# Patient Record
Sex: FEMALE | Race: WHITE | NOT HISPANIC OR LATINO | Employment: UNEMPLOYED | URBAN - METROPOLITAN AREA
[De-identification: names, ages, dates, MRNs, and addresses within clinical notes are randomized per-mention and may not be internally consistent; named-entity substitution may affect disease eponyms.]

---

## 2018-03-13 ENCOUNTER — APPOINTMENT (OUTPATIENT)
Dept: PREADMISSION TESTING | Facility: HOSPITAL | Age: 58
End: 2018-03-13

## 2018-03-13 VITALS — WEIGHT: 160.94 LBS | HEIGHT: 65 IN | BODY MASS INDEX: 26.81 KG/M2

## 2018-03-13 LAB
DEPRECATED RDW RBC AUTO: 46.1 FL (ref 37–54)
ERYTHROCYTE [DISTWIDTH] IN BLOOD BY AUTOMATED COUNT: 13.1 % (ref 11.3–14.5)
GLUCOSE BLD-MCNC: 84 MG/DL (ref 70–100)
HBA1C MFR BLD: 5.7 % (ref 4.8–5.6)
HCT VFR BLD AUTO: 39.9 % (ref 34.5–44)
HGB BLD-MCNC: 13.1 G/DL (ref 11.5–15.5)
MCH RBC QN AUTO: 31.4 PG (ref 27–31)
MCHC RBC AUTO-ENTMCNC: 32.8 G/DL (ref 32–36)
MCV RBC AUTO: 95.7 FL (ref 80–99)
PLATELET # BLD AUTO: 206 10*3/MM3 (ref 150–450)
PMV BLD AUTO: 10.4 FL (ref 6–12)
RBC # BLD AUTO: 4.17 10*6/MM3 (ref 3.89–5.14)
WBC NRBC COR # BLD: 7.03 10*3/MM3 (ref 3.5–10.8)

## 2018-03-13 PROCEDURE — 82947 ASSAY GLUCOSE BLOOD QUANT: CPT | Performed by: ORTHOPAEDIC SURGERY

## 2018-03-13 PROCEDURE — 36415 COLL VENOUS BLD VENIPUNCTURE: CPT

## 2018-03-13 PROCEDURE — 83036 HEMOGLOBIN GLYCOSYLATED A1C: CPT | Performed by: ORTHOPAEDIC SURGERY

## 2018-03-13 PROCEDURE — 93005 ELECTROCARDIOGRAM TRACING: CPT

## 2018-03-13 PROCEDURE — 85027 COMPLETE CBC AUTOMATED: CPT | Performed by: ORTHOPAEDIC SURGERY

## 2018-03-13 PROCEDURE — 93010 ELECTROCARDIOGRAM REPORT: CPT | Performed by: INTERNAL MEDICINE

## 2018-03-13 RX ORDER — LEVOTHYROXINE SODIUM 0.1 MG/1
100 TABLET ORAL DAILY
COMMUNITY

## 2018-03-13 RX ORDER — SERTRALINE HYDROCHLORIDE 100 MG/1
100 TABLET, FILM COATED ORAL 2 TIMES DAILY
COMMUNITY

## 2018-03-14 ENCOUNTER — ANESTHESIA EVENT (OUTPATIENT)
Dept: PERIOP | Facility: HOSPITAL | Age: 58
End: 2018-03-14

## 2018-03-14 ENCOUNTER — APPOINTMENT (OUTPATIENT)
Dept: GENERAL RADIOLOGY | Facility: HOSPITAL | Age: 58
End: 2018-03-14

## 2018-03-14 ENCOUNTER — ANESTHESIA (OUTPATIENT)
Dept: PERIOP | Facility: HOSPITAL | Age: 58
End: 2018-03-14

## 2018-03-14 ENCOUNTER — HOSPITAL ENCOUNTER (INPATIENT)
Facility: HOSPITAL | Age: 58
LOS: 3 days | Discharge: HOME | End: 2018-03-17
Attending: ORTHOPAEDIC SURGERY | Admitting: ORTHOPAEDIC SURGERY

## 2018-03-14 DIAGNOSIS — Z78.9 IMPAIRED MOBILITY AND ADLS: ICD-10-CM

## 2018-03-14 DIAGNOSIS — Z74.09 IMPAIRED FUNCTIONAL MOBILITY, BALANCE, GAIT, AND ENDURANCE: Primary | ICD-10-CM

## 2018-03-14 DIAGNOSIS — Z74.09 IMPAIRED MOBILITY AND ADLS: ICD-10-CM

## 2018-03-14 PROBLEM — M54.9 BACK PAIN: Status: ACTIVE | Noted: 2018-03-14

## 2018-03-14 PROBLEM — E11.9 DM (DIABETES MELLITUS) (HCC): Status: ACTIVE | Noted: 2018-03-14

## 2018-03-14 PROBLEM — Z98.1 S/P LUMBAR FUSION: Status: ACTIVE | Noted: 2018-03-14

## 2018-03-14 PROBLEM — E03.9 HYPOTHYROID: Status: ACTIVE | Noted: 2018-03-14

## 2018-03-14 PROBLEM — Z72.0 TOBACCO ABUSE: Status: ACTIVE | Noted: 2018-03-14

## 2018-03-14 LAB
GLUCOSE BLDC GLUCOMTR-MCNC: 138 MG/DL (ref 70–130)
GLUCOSE BLDC GLUCOMTR-MCNC: 140 MG/DL (ref 70–130)
GLUCOSE BLDC GLUCOMTR-MCNC: 170 MG/DL (ref 70–130)

## 2018-03-14 PROCEDURE — 25010000003 CEFAZOLIN PER 500 MG: Performed by: ORTHOPAEDIC SURGERY

## 2018-03-14 PROCEDURE — 25010000002 FENTANYL CITRATE (PF) 100 MCG/2ML SOLUTION: Performed by: NURSE ANESTHETIST, CERTIFIED REGISTERED

## 2018-03-14 PROCEDURE — 25010000002 NEOSTIGMINE PER 0.5 MG: Performed by: NURSE ANESTHETIST, CERTIFIED REGISTERED

## 2018-03-14 PROCEDURE — 07DR3ZZ EXTRACTION OF ILIAC BONE MARROW, PERCUTANEOUS APPROACH: ICD-10-PCS | Performed by: ORTHOPAEDIC SURGERY

## 2018-03-14 PROCEDURE — 25010000002 HYDROMORPHONE PER 4 MG: Performed by: NURSE ANESTHETIST, CERTIFIED REGISTERED

## 2018-03-14 PROCEDURE — P9041 ALBUMIN (HUMAN),5%, 50ML: HCPCS

## 2018-03-14 PROCEDURE — 97161 PT EVAL LOW COMPLEX 20 MIN: CPT

## 2018-03-14 PROCEDURE — 63710000001 INSULIN LISPRO (HUMAN) PER 5 UNITS: Performed by: NURSE PRACTITIONER

## 2018-03-14 PROCEDURE — 82962 GLUCOSE BLOOD TEST: CPT

## 2018-03-14 PROCEDURE — 0SG0071 FUSION OF LUMBAR VERTEBRAL JOINT WITH AUTOLOGOUS TISSUE SUBSTITUTE, POSTERIOR APPROACH, POSTERIOR COLUMN, OPEN APPROACH: ICD-10-PCS | Performed by: ORTHOPAEDIC SURGERY

## 2018-03-14 PROCEDURE — 25010000002 PROPOFOL 10 MG/ML EMULSION: Performed by: NURSE ANESTHETIST, CERTIFIED REGISTERED

## 2018-03-14 PROCEDURE — 25810000003 SODIUM CHLORIDE 0.9 % WITH KCL 20 MEQ 20-0.9 MEQ/L-% SOLUTION: Performed by: ORTHOPAEDIC SURGERY

## 2018-03-14 PROCEDURE — 25010000002 ONDANSETRON PER 1 MG: Performed by: NURSE ANESTHETIST, CERTIFIED REGISTERED

## 2018-03-14 PROCEDURE — 76001 HC FLUORO GREATER THAN 1 HOUR: CPT

## 2018-03-14 PROCEDURE — C1713 ANCHOR/SCREW BN/BN,TIS/BN: HCPCS | Performed by: ORTHOPAEDIC SURGERY

## 2018-03-14 PROCEDURE — 0SG00AJ FUSION OF LUMBAR VERTEBRAL JOINT WITH INTERBODY FUSION DEVICE, POSTERIOR APPROACH, ANTERIOR COLUMN, OPEN APPROACH: ICD-10-PCS | Performed by: ORTHOPAEDIC SURGERY

## 2018-03-14 PROCEDURE — 94799 UNLISTED PULMONARY SVC/PX: CPT

## 2018-03-14 PROCEDURE — 25010000002 HEPARIN (PORCINE) PER 1000 UNITS

## 2018-03-14 PROCEDURE — 25010000002 ALBUMIN HUMAN 5% PER 50 ML

## 2018-03-14 PROCEDURE — 25010000002 HYDROMORPHONE PER 4 MG: Performed by: ORTHOPAEDIC SURGERY

## 2018-03-14 PROCEDURE — 76000 FLUOROSCOPY <1 HR PHYS/QHP: CPT

## 2018-03-14 PROCEDURE — 25010000002 DEXAMETHASONE PER 1 MG: Performed by: NURSE ANESTHETIST, CERTIFIED REGISTERED

## 2018-03-14 DEVICE — ROD PREBNT SPINE EXPEDIUM TI 5.5X40MM: Type: IMPLANTABLE DEVICE | Status: FUNCTIONAL

## 2018-03-14 DEVICE — SCRW CORT VIPER PLS5.5 TI FIX 6X40MM: Type: IMPLANTABLE DEVICE | Status: FUNCTIONAL

## 2018-03-14 DEVICE — SCRW VIPER INNR ST: Type: IMPLANTABLE DEVICE | Status: FUNCTIONAL

## 2018-03-14 DEVICE — SPACR OPAL 10X12X28MM: Type: IMPLANTABLE DEVICE | Status: FUNCTIONAL

## 2018-03-14 RX ORDER — ONDANSETRON 2 MG/ML
4 INJECTION INTRAMUSCULAR; INTRAVENOUS EVERY 6 HOURS PRN
Status: DISCONTINUED | OUTPATIENT
Start: 2018-03-14 | End: 2018-03-15 | Stop reason: SDUPTHER

## 2018-03-14 RX ORDER — CEFAZOLIN SODIUM IN 0.9 % NACL 2 G/100 ML
2 PLASTIC BAG, INJECTION (ML) INTRAVENOUS ONCE
Status: COMPLETED | OUTPATIENT
Start: 2018-03-14 | End: 2018-03-14

## 2018-03-14 RX ORDER — DEXTROSE MONOHYDRATE 25 G/50ML
25 INJECTION, SOLUTION INTRAVENOUS
Status: DISCONTINUED | OUTPATIENT
Start: 2018-03-14 | End: 2018-03-17 | Stop reason: HOSPADM

## 2018-03-14 RX ORDER — GLYCOPYRROLATE 0.2 MG/ML
INJECTION INTRAMUSCULAR; INTRAVENOUS AS NEEDED
Status: DISCONTINUED | OUTPATIENT
Start: 2018-03-14 | End: 2018-03-14 | Stop reason: SURG

## 2018-03-14 RX ORDER — BISACODYL 5 MG/1
5 TABLET, DELAYED RELEASE ORAL DAILY PRN
Status: DISCONTINUED | OUTPATIENT
Start: 2018-03-14 | End: 2018-03-17 | Stop reason: HOSPADM

## 2018-03-14 RX ORDER — NICOTINE 21 MG/24HR
1 PATCH, TRANSDERMAL 24 HOURS TRANSDERMAL EVERY 24 HOURS
Status: DISCONTINUED | OUTPATIENT
Start: 2018-03-14 | End: 2018-03-17 | Stop reason: HOSPADM

## 2018-03-14 RX ORDER — NALOXONE HCL 0.4 MG/ML
0.4 VIAL (ML) INJECTION
Status: DISCONTINUED | OUTPATIENT
Start: 2018-03-14 | End: 2018-03-17 | Stop reason: HOSPADM

## 2018-03-14 RX ORDER — PROPOFOL 10 MG/ML
VIAL (ML) INTRAVENOUS AS NEEDED
Status: DISCONTINUED | OUTPATIENT
Start: 2018-03-14 | End: 2018-03-14 | Stop reason: SURG

## 2018-03-14 RX ORDER — PROMETHAZINE HYDROCHLORIDE 25 MG/ML
6.25 INJECTION, SOLUTION INTRAMUSCULAR; INTRAVENOUS ONCE AS NEEDED
Status: DISCONTINUED | OUTPATIENT
Start: 2018-03-14 | End: 2018-03-14 | Stop reason: HOSPADM

## 2018-03-14 RX ORDER — PROMETHAZINE HYDROCHLORIDE 25 MG/1
25 SUPPOSITORY RECTAL ONCE AS NEEDED
Status: DISCONTINUED | OUTPATIENT
Start: 2018-03-14 | End: 2018-03-14 | Stop reason: HOSPADM

## 2018-03-14 RX ORDER — MORPHINE SULFATE 2 MG/ML
1 INJECTION, SOLUTION INTRAMUSCULAR; INTRAVENOUS EVERY 4 HOURS PRN
Status: DISCONTINUED | OUTPATIENT
Start: 2018-03-14 | End: 2018-03-17 | Stop reason: HOSPADM

## 2018-03-14 RX ORDER — FENTANYL CITRATE 50 UG/ML
INJECTION, SOLUTION INTRAMUSCULAR; INTRAVENOUS AS NEEDED
Status: DISCONTINUED | OUTPATIENT
Start: 2018-03-14 | End: 2018-03-14 | Stop reason: SURG

## 2018-03-14 RX ORDER — SODIUM CHLORIDE 0.9 % (FLUSH) 0.9 %
1-10 SYRINGE (ML) INJECTION AS NEEDED
Status: DISCONTINUED | OUTPATIENT
Start: 2018-03-14 | End: 2018-03-17 | Stop reason: HOSPADM

## 2018-03-14 RX ORDER — FENTANYL CITRATE 50 UG/ML
50 INJECTION, SOLUTION INTRAMUSCULAR; INTRAVENOUS
Status: DISCONTINUED | OUTPATIENT
Start: 2018-03-14 | End: 2018-03-14 | Stop reason: HOSPADM

## 2018-03-14 RX ORDER — ACETAMINOPHEN 325 MG/1
650 TABLET ORAL EVERY 4 HOURS PRN
Status: DISCONTINUED | OUTPATIENT
Start: 2018-03-14 | End: 2018-03-17 | Stop reason: HOSPADM

## 2018-03-14 RX ORDER — PROMETHAZINE HYDROCHLORIDE 25 MG/1
25 TABLET ORAL ONCE AS NEEDED
Status: DISCONTINUED | OUTPATIENT
Start: 2018-03-14 | End: 2018-03-14 | Stop reason: HOSPADM

## 2018-03-14 RX ORDER — PANTOPRAZOLE SODIUM 40 MG/1
40 TABLET, DELAYED RELEASE ORAL
Status: DISCONTINUED | OUTPATIENT
Start: 2018-03-15 | End: 2018-03-17 | Stop reason: HOSPADM

## 2018-03-14 RX ORDER — ALBUMIN, HUMAN INJ 5% 5 %
SOLUTION INTRAVENOUS CONTINUOUS PRN
Status: DISCONTINUED | OUTPATIENT
Start: 2018-03-14 | End: 2018-03-14 | Stop reason: SURG

## 2018-03-14 RX ORDER — SODIUM CHLORIDE, SODIUM LACTATE, POTASSIUM CHLORIDE, CALCIUM CHLORIDE 600; 310; 30; 20 MG/100ML; MG/100ML; MG/100ML; MG/100ML
9 INJECTION, SOLUTION INTRAVENOUS CONTINUOUS
Status: DISCONTINUED | OUTPATIENT
Start: 2018-03-14 | End: 2018-03-17 | Stop reason: HOSPADM

## 2018-03-14 RX ORDER — CELECOXIB 200 MG/1
200 CAPSULE ORAL ONCE
Status: COMPLETED | OUTPATIENT
Start: 2018-03-14 | End: 2018-03-14

## 2018-03-14 RX ORDER — DEXAMETHASONE SODIUM PHOSPHATE 10 MG/ML
INJECTION INTRAMUSCULAR; INTRAVENOUS AS NEEDED
Status: DISCONTINUED | OUTPATIENT
Start: 2018-03-14 | End: 2018-03-14 | Stop reason: SURG

## 2018-03-14 RX ORDER — LIDOCAINE HYDROCHLORIDE 10 MG/ML
0.5 INJECTION, SOLUTION EPIDURAL; INFILTRATION; INTRACAUDAL; PERINEURAL ONCE AS NEEDED
Status: COMPLETED | OUTPATIENT
Start: 2018-03-14 | End: 2018-03-14

## 2018-03-14 RX ORDER — BISACODYL 10 MG
10 SUPPOSITORY, RECTAL RECTAL DAILY PRN
Status: DISCONTINUED | OUTPATIENT
Start: 2018-03-14 | End: 2018-03-17 | Stop reason: HOSPADM

## 2018-03-14 RX ORDER — LEVOTHYROXINE SODIUM 0.1 MG/1
100 TABLET ORAL
Status: DISCONTINUED | OUTPATIENT
Start: 2018-03-15 | End: 2018-03-17 | Stop reason: HOSPADM

## 2018-03-14 RX ORDER — OXYCODONE HCL 10 MG/1
10 TABLET, FILM COATED, EXTENDED RELEASE ORAL ONCE
Status: COMPLETED | OUTPATIENT
Start: 2018-03-14 | End: 2018-03-14

## 2018-03-14 RX ORDER — OXYCODONE AND ACETAMINOPHEN 7.5; 325 MG/1; MG/1
1 TABLET ORAL EVERY 4 HOURS PRN
Status: DISCONTINUED | OUTPATIENT
Start: 2018-03-14 | End: 2018-03-15

## 2018-03-14 RX ORDER — LIDOCAINE HYDROCHLORIDE 10 MG/ML
INJECTION, SOLUTION INFILTRATION; PERINEURAL AS NEEDED
Status: DISCONTINUED | OUTPATIENT
Start: 2018-03-14 | End: 2018-03-14 | Stop reason: SURG

## 2018-03-14 RX ORDER — ZOLPIDEM TARTRATE 5 MG/1
5 TABLET ORAL NIGHTLY PRN
Status: DISCONTINUED | OUTPATIENT
Start: 2018-03-14 | End: 2018-03-17 | Stop reason: HOSPADM

## 2018-03-14 RX ORDER — HYDROMORPHONE HCL 110MG/55ML
2 PATIENT CONTROLLED ANALGESIA SYRINGE INTRAVENOUS EVERY 4 HOURS PRN
Status: DISCONTINUED | OUTPATIENT
Start: 2018-03-14 | End: 2018-03-17 | Stop reason: HOSPADM

## 2018-03-14 RX ORDER — SODIUM CHLORIDE 0.9 % (FLUSH) 0.9 %
1-10 SYRINGE (ML) INJECTION AS NEEDED
Status: DISCONTINUED | OUTPATIENT
Start: 2018-03-14 | End: 2018-03-14 | Stop reason: HOSPADM

## 2018-03-14 RX ORDER — HYDRALAZINE HYDROCHLORIDE 20 MG/ML
10 INJECTION INTRAMUSCULAR; INTRAVENOUS EVERY 6 HOURS PRN
Status: DISCONTINUED | OUTPATIENT
Start: 2018-03-14 | End: 2018-03-17 | Stop reason: HOSPADM

## 2018-03-14 RX ORDER — NICOTINE POLACRILEX 4 MG
15 LOZENGE BUCCAL
Status: DISCONTINUED | OUTPATIENT
Start: 2018-03-14 | End: 2018-03-17 | Stop reason: HOSPADM

## 2018-03-14 RX ORDER — ONDANSETRON 2 MG/ML
4 INJECTION INTRAMUSCULAR; INTRAVENOUS EVERY 6 HOURS PRN
Status: DISCONTINUED | OUTPATIENT
Start: 2018-03-14 | End: 2018-03-17 | Stop reason: HOSPADM

## 2018-03-14 RX ORDER — MAGNESIUM HYDROXIDE 1200 MG/15ML
LIQUID ORAL AS NEEDED
Status: DISCONTINUED | OUTPATIENT
Start: 2018-03-14 | End: 2018-03-14 | Stop reason: HOSPADM

## 2018-03-14 RX ORDER — FAMOTIDINE 20 MG/1
20 TABLET, FILM COATED ORAL ONCE
Status: COMPLETED | OUTPATIENT
Start: 2018-03-14 | End: 2018-03-14

## 2018-03-14 RX ORDER — ACETAMINOPHEN 500 MG
1000 TABLET ORAL ONCE
Status: COMPLETED | OUTPATIENT
Start: 2018-03-14 | End: 2018-03-14

## 2018-03-14 RX ORDER — SODIUM CHLORIDE AND POTASSIUM CHLORIDE 150; 900 MG/100ML; MG/100ML
100 INJECTION, SOLUTION INTRAVENOUS CONTINUOUS
Status: DISCONTINUED | OUTPATIENT
Start: 2018-03-14 | End: 2018-03-17 | Stop reason: HOSPADM

## 2018-03-14 RX ORDER — HYDROMORPHONE HYDROCHLORIDE 1 MG/ML
0.5 INJECTION, SOLUTION INTRAMUSCULAR; INTRAVENOUS; SUBCUTANEOUS
Status: DISCONTINUED | OUTPATIENT
Start: 2018-03-14 | End: 2018-03-14 | Stop reason: HOSPADM

## 2018-03-14 RX ORDER — PREGABALIN 75 MG/1
75 CAPSULE ORAL ONCE
Status: COMPLETED | OUTPATIENT
Start: 2018-03-14 | End: 2018-03-14

## 2018-03-14 RX ORDER — GABAPENTIN 100 MG/1
200 CAPSULE ORAL NIGHTLY
Status: DISCONTINUED | OUTPATIENT
Start: 2018-03-14 | End: 2018-03-17 | Stop reason: HOSPADM

## 2018-03-14 RX ORDER — ONDANSETRON 2 MG/ML
INJECTION INTRAMUSCULAR; INTRAVENOUS AS NEEDED
Status: DISCONTINUED | OUTPATIENT
Start: 2018-03-14 | End: 2018-03-14 | Stop reason: SURG

## 2018-03-14 RX ORDER — SERTRALINE HYDROCHLORIDE 100 MG/1
100 TABLET, FILM COATED ORAL DAILY
Status: DISCONTINUED | OUTPATIENT
Start: 2018-03-15 | End: 2018-03-17 | Stop reason: HOSPADM

## 2018-03-14 RX ORDER — VECURONIUM BROMIDE 1 MG/ML
INJECTION, POWDER, LYOPHILIZED, FOR SOLUTION INTRAVENOUS AS NEEDED
Status: DISCONTINUED | OUTPATIENT
Start: 2018-03-14 | End: 2018-03-14 | Stop reason: SURG

## 2018-03-14 RX ADMIN — LIDOCAINE HYDROCHLORIDE 50 MG: 10 INJECTION, SOLUTION INFILTRATION; PERINEURAL at 08:13

## 2018-03-14 RX ADMIN — VECURONIUM BROMIDE 2 MG: 1 INJECTION, POWDER, LYOPHILIZED, FOR SOLUTION INTRAVENOUS at 09:46

## 2018-03-14 RX ADMIN — FENTANYL CITRATE: 50 INJECTION INTRAMUSCULAR; INTRAVENOUS at 14:40

## 2018-03-14 RX ADMIN — ALBUMIN, HUMAN INJ 5%: 5 SOLUTION at 10:44

## 2018-03-14 RX ADMIN — CEFAZOLIN SODIUM 2 G: 1 INJECTION, POWDER, FOR SOLUTION INTRAMUSCULAR; INTRAVENOUS at 18:39

## 2018-03-14 RX ADMIN — CELECOXIB 200 MG: 200 CAPSULE ORAL at 07:24

## 2018-03-14 RX ADMIN — OXYCODONE HYDROCHLORIDE AND ACETAMINOPHEN 1 TABLET: 7.5; 325 TABLET ORAL at 21:07

## 2018-03-14 RX ADMIN — MUPIROCIN: 20 OINTMENT TOPICAL at 07:25

## 2018-03-14 RX ADMIN — ALBUMIN, HUMAN INJ 5%: 5 SOLUTION at 10:24

## 2018-03-14 RX ADMIN — HYDROMORPHONE HYDROCHLORIDE 0.5 MG: 10 INJECTION INTRAMUSCULAR; INTRAVENOUS; SUBCUTANEOUS at 11:53

## 2018-03-14 RX ADMIN — FENTANYL CITRATE: 50 INJECTION INTRAMUSCULAR; INTRAVENOUS at 13:00

## 2018-03-14 RX ADMIN — OXYCODONE HYDROCHLORIDE AND ACETAMINOPHEN 1 TABLET: 7.5; 325 TABLET ORAL at 17:12

## 2018-03-14 RX ADMIN — EPHEDRINE SULFATE 10 MG: 50 INJECTION INTRAMUSCULAR; INTRAVENOUS; SUBCUTANEOUS at 09:12

## 2018-03-14 RX ADMIN — FENTANYL CITRATE 150 MCG: 50 INJECTION, SOLUTION INTRAMUSCULAR; INTRAVENOUS at 08:13

## 2018-03-14 RX ADMIN — VECURONIUM BROMIDE 4 MG: 1 INJECTION, POWDER, LYOPHILIZED, FOR SOLUTION INTRAVENOUS at 08:13

## 2018-03-14 RX ADMIN — GLYCOPYRROLATE 0.4 MG: 0.2 INJECTION, SOLUTION INTRAMUSCULAR; INTRAVENOUS at 11:15

## 2018-03-14 RX ADMIN — HYDROMORPHONE HYDROCHLORIDE 2 MG: 2 INJECTION INTRAMUSCULAR; INTRAVENOUS; SUBCUTANEOUS at 23:02

## 2018-03-14 RX ADMIN — PREGABALIN 75 MG: 75 CAPSULE ORAL at 07:24

## 2018-03-14 RX ADMIN — FAMOTIDINE 20 MG: 20 TABLET, FILM COATED ORAL at 07:24

## 2018-03-14 RX ADMIN — HYDROMORPHONE HYDROCHLORIDE 0.5 MG: 10 INJECTION INTRAMUSCULAR; INTRAVENOUS; SUBCUTANEOUS at 15:03

## 2018-03-14 RX ADMIN — FENTANYL CITRATE 50 MCG: 50 INJECTION, SOLUTION INTRAMUSCULAR; INTRAVENOUS at 11:42

## 2018-03-14 RX ADMIN — POTASSIUM CHLORIDE AND SODIUM CHLORIDE 100 ML/HR: 900; 150 INJECTION, SOLUTION INTRAVENOUS at 16:00

## 2018-03-14 RX ADMIN — VECURONIUM BROMIDE 2 MG: 1 INJECTION, POWDER, LYOPHILIZED, FOR SOLUTION INTRAVENOUS at 08:37

## 2018-03-14 RX ADMIN — NICOTINE 1 PATCH: 21 PATCH, EXTENDED RELEASE TRANSDERMAL at 17:12

## 2018-03-14 RX ADMIN — FENTANYL CITRATE 50 MCG: 50 INJECTION, SOLUTION INTRAMUSCULAR; INTRAVENOUS at 11:30

## 2018-03-14 RX ADMIN — PROPOFOL 150 MG: 10 INJECTION, EMULSION INTRAVENOUS at 08:13

## 2018-03-14 RX ADMIN — OXYCODONE HYDROCHLORIDE 10 MG: 10 TABLET, FILM COATED, EXTENDED RELEASE ORAL at 07:24

## 2018-03-14 RX ADMIN — GABAPENTIN 200 MG: 100 CAPSULE ORAL at 21:00

## 2018-03-14 RX ADMIN — DEXAMETHASONE SODIUM PHOSPHATE 8 MG: 10 INJECTION INTRAMUSCULAR; INTRAVENOUS at 08:30

## 2018-03-14 RX ADMIN — LIDOCAINE HYDROCHLORIDE 0.2 ML: 10 INJECTION, SOLUTION EPIDURAL; INFILTRATION; INTRACAUDAL; PERINEURAL at 07:03

## 2018-03-14 RX ADMIN — SODIUM CHLORIDE, POTASSIUM CHLORIDE, SODIUM LACTATE AND CALCIUM CHLORIDE 9 ML/HR: 600; 310; 30; 20 INJECTION, SOLUTION INTRAVENOUS at 07:03

## 2018-03-14 RX ADMIN — HYDROMORPHONE HYDROCHLORIDE 2 MG: 2 INJECTION INTRAMUSCULAR; INTRAVENOUS; SUBCUTANEOUS at 18:46

## 2018-03-14 RX ADMIN — ONDANSETRON 4 MG: 2 INJECTION INTRAMUSCULAR; INTRAVENOUS at 11:07

## 2018-03-14 RX ADMIN — CEFAZOLIN 2 G: 1 INJECTION, POWDER, FOR SOLUTION INTRAMUSCULAR; INTRAVENOUS; PARENTERAL at 08:08

## 2018-03-14 RX ADMIN — HYDROMORPHONE HYDROCHLORIDE 0.5 MG: 10 INJECTION INTRAMUSCULAR; INTRAVENOUS; SUBCUTANEOUS at 16:05

## 2018-03-14 RX ADMIN — SODIUM CHLORIDE, POTASSIUM CHLORIDE, SODIUM LACTATE AND CALCIUM CHLORIDE: 600; 310; 30; 20 INJECTION, SOLUTION INTRAVENOUS at 11:30

## 2018-03-14 RX ADMIN — ACETAMINOPHEN 1000 MG: 500 TABLET ORAL at 07:24

## 2018-03-14 RX ADMIN — HYDROMORPHONE HYDROCHLORIDE 0.5 MG: 10 INJECTION INTRAMUSCULAR; INTRAVENOUS; SUBCUTANEOUS at 12:16

## 2018-03-14 RX ADMIN — Medication 3 MG: at 11:15

## 2018-03-14 NOTE — ANESTHESIA PREPROCEDURE EVALUATION
Anesthesia Evaluation     Patient summary reviewed and Nursing notes reviewed                Airway   Mallampati: II  TM distance: >3 FB  Neck ROM: full  no difficulty expected and No difficulty expected  Dental - normal exam   (+) upper dentures    Pulmonary - normal exam   (+) a smoker Current,   Cardiovascular - negative cardio ROS and normal exam        Neuro/Psych- negative ROS  GI/Hepatic/Renal/Endo    (+)  GERD,  diabetes mellitus (diet controlled), hypothyroidism,     Musculoskeletal     Abdominal  - normal exam    Bowel sounds: normal.   Substance History - negative use     OB/GYN negative ob/gyn ROS         Other   (+) arthritis                   Anesthesia Plan    ASA 3     general     intravenous induction   Anesthetic plan and risks discussed with patient.    Plan discussed with CRNA.

## 2018-03-14 NOTE — ANESTHESIA PROCEDURE NOTES
Airway  Urgency: elective    Airway not difficult    General Information and Staff    Patient location during procedure: OR  CRNA: ANIBAL VELÁZQUEZ    Indications and Patient Condition  Indications for airway management: airway protection    Preoxygenated: yes  MILS maintained throughout  Mask difficulty assessment: 2 - vent by mask + OA or adjuvant +/- NMBA    Final Airway Details  Final airway type: endotracheal airway      Successful airway: ETT  Cuffed: yes   Successful intubation technique: direct laryngoscopy  Endotracheal tube insertion site: oral  Blade: Moore  Blade size: #2  ETT size: 7.0 mm  Cormack-Lehane Classification: grade I - full view of glottis  Placement verified by: chest auscultation and capnometry   Cuff volume (mL): 5  Measured from: lips  ETT to lips (cm): 20  Number of attempts at approach: 1    Additional Comments  Pt to OR 13.  Pt supine on stretcher. Bilateral arms to side. ASA monitors applied. Pre-O2 with 100% Oxygen. SIVI. Atraumatic intubation. +ETCO2. +BBS. Pt positioned prone with surgeon and OR staff assistance, after OETT secured.

## 2018-03-14 NOTE — ANESTHESIA POSTPROCEDURE EVALUATION
Patient: Pretty Squires    Procedure Summary     Date:  03/14/18 Room / Location:   [facility] [address] [place] [address] [facility]    Anesthesia Start:  0808 Anesthesia Stop:      Procedure:  LUMBAR DISCECTOMY POSTERIOR WITH FUSION INSTRUMENTATION (N/A Spine Lumbar) Diagnosis:      Surgeon:  Jose Maria Garcia MD Provider:  Asad Arce MD    Anesthesia Type:  general ASA Status:  3          Anesthesia Type: general  Last vitals 3/14/18 @ 1139  BP   110/72   Temp   97.2   Pulse   96   Resp   16   SpO2   96%     Post Anesthesia Care and Evaluation    Patient location during evaluation: PACU  Patient participation: complete - patient cannot participate  Level of consciousness: responsive to physical stimuli  Pain score: 0  Pain management: adequate  Airway patency: patent  Anesthetic complications: No anesthetic complications  PONV Status: none  Cardiovascular status: stable  Respiratory status: acceptable, nasal cannula and spontaneous ventilation  Hydration status: stable    Comments: Pt transferred to PACU with O2. Vital signs stable. Report to PACU RN and care accepted.

## 2018-03-15 PROBLEM — D62 POSTOPERATIVE ANEMIA DUE TO ACUTE BLOOD LOSS: Status: ACTIVE | Noted: 2018-03-15

## 2018-03-15 LAB
ANION GAP SERPL CALCULATED.3IONS-SCNC: -1 MMOL/L (ref 3–11)
BUN BLD-MCNC: 10 MG/DL (ref 9–23)
BUN/CREAT SERPL: 16.7 (ref 7–25)
CALCIUM SPEC-SCNC: 8.1 MG/DL (ref 8.7–10.4)
CHLORIDE SERPL-SCNC: 115 MMOL/L (ref 99–109)
CO2 SERPL-SCNC: 28 MMOL/L (ref 20–31)
CREAT BLD-MCNC: 0.6 MG/DL (ref 0.6–1.3)
DEPRECATED RDW RBC AUTO: 48.2 FL (ref 37–54)
DEPRECATED RDW RBC AUTO: 49.4 FL (ref 37–54)
ERYTHROCYTE [DISTWIDTH] IN BLOOD BY AUTOMATED COUNT: 13.3 % (ref 11.3–14.5)
ERYTHROCYTE [DISTWIDTH] IN BLOOD BY AUTOMATED COUNT: 13.5 % (ref 11.3–14.5)
GFR SERPL CREATININE-BSD FRML MDRD: 102 ML/MIN/1.73
GLUCOSE BLD-MCNC: 115 MG/DL (ref 70–100)
GLUCOSE BLDC GLUCOMTR-MCNC: 113 MG/DL (ref 70–130)
GLUCOSE BLDC GLUCOMTR-MCNC: 119 MG/DL (ref 70–130)
GLUCOSE BLDC GLUCOMTR-MCNC: 123 MG/DL (ref 70–130)
HCT VFR BLD AUTO: 30 % (ref 34.5–44)
HCT VFR BLD AUTO: 30.3 % (ref 34.5–44)
HGB BLD-MCNC: 9.4 G/DL (ref 11.5–15.5)
HGB BLD-MCNC: 9.5 G/DL (ref 11.5–15.5)
MCH RBC QN AUTO: 31.3 PG (ref 27–31)
MCH RBC QN AUTO: 31.4 PG (ref 27–31)
MCHC RBC AUTO-ENTMCNC: 31.3 G/DL (ref 32–36)
MCHC RBC AUTO-ENTMCNC: 31.4 G/DL (ref 32–36)
MCV RBC AUTO: 100 FL (ref 80–99)
MCV RBC AUTO: 100 FL (ref 80–99)
PLATELET # BLD AUTO: 174 10*3/MM3 (ref 150–450)
PLATELET # BLD AUTO: 193 10*3/MM3 (ref 150–450)
PMV BLD AUTO: 10.1 FL (ref 6–12)
PMV BLD AUTO: 10.4 FL (ref 6–12)
POTASSIUM BLD-SCNC: 4.3 MMOL/L (ref 3.5–5.5)
RBC # BLD AUTO: 3 10*6/MM3 (ref 3.89–5.14)
RBC # BLD AUTO: 3.03 10*6/MM3 (ref 3.89–5.14)
SODIUM BLD-SCNC: 142 MMOL/L (ref 132–146)
WBC NRBC COR # BLD: 11.24 10*3/MM3 (ref 3.5–10.8)
WBC NRBC COR # BLD: 11.86 10*3/MM3 (ref 3.5–10.8)

## 2018-03-15 PROCEDURE — 85027 COMPLETE CBC AUTOMATED: CPT | Performed by: NURSE PRACTITIONER

## 2018-03-15 PROCEDURE — 97116 GAIT TRAINING THERAPY: CPT

## 2018-03-15 PROCEDURE — 25010000002 HYDROMORPHONE PER 4 MG: Performed by: ORTHOPAEDIC SURGERY

## 2018-03-15 PROCEDURE — 82962 GLUCOSE BLOOD TEST: CPT

## 2018-03-15 PROCEDURE — 97166 OT EVAL MOD COMPLEX 45 MIN: CPT | Performed by: OCCUPATIONAL THERAPIST

## 2018-03-15 PROCEDURE — 25010000003 CEFAZOLIN PER 500 MG: Performed by: ORTHOPAEDIC SURGERY

## 2018-03-15 PROCEDURE — 25010000002 MORPHINE SULFATE (PF) 2 MG/ML SOLUTION: Performed by: ORTHOPAEDIC SURGERY

## 2018-03-15 PROCEDURE — 80048 BASIC METABOLIC PNL TOTAL CA: CPT | Performed by: NURSE PRACTITIONER

## 2018-03-15 PROCEDURE — 25010000002 ONDANSETRON PER 1 MG: Performed by: NURSE PRACTITIONER

## 2018-03-15 PROCEDURE — 85027 COMPLETE CBC AUTOMATED: CPT | Performed by: INTERNAL MEDICINE

## 2018-03-15 RX ORDER — OXYCODONE AND ACETAMINOPHEN 10; 325 MG/1; MG/1
1 TABLET ORAL EVERY 4 HOURS PRN
Status: DISCONTINUED | OUTPATIENT
Start: 2018-03-15 | End: 2018-03-17 | Stop reason: HOSPADM

## 2018-03-15 RX ORDER — ONDANSETRON 4 MG/1
4 TABLET, FILM COATED ORAL EVERY 6 HOURS PRN
Status: DISCONTINUED | OUTPATIENT
Start: 2018-03-15 | End: 2018-03-17 | Stop reason: HOSPADM

## 2018-03-15 RX ADMIN — MORPHINE SULFATE 1 MG: 2 INJECTION, SOLUTION INTRAMUSCULAR; INTRAVENOUS at 02:08

## 2018-03-15 RX ADMIN — SERTRALINE HYDROCHLORIDE 100 MG: 100 TABLET ORAL at 09:42

## 2018-03-15 RX ADMIN — ONDANSETRON 4 MG: 4 TABLET, FILM COATED ORAL at 22:03

## 2018-03-15 RX ADMIN — OXYCODONE HYDROCHLORIDE AND ACETAMINOPHEN 1 TABLET: 10; 325 TABLET ORAL at 09:42

## 2018-03-15 RX ADMIN — OXYCODONE HYDROCHLORIDE AND ACETAMINOPHEN 1 TABLET: 7.5; 325 TABLET ORAL at 05:43

## 2018-03-15 RX ADMIN — GABAPENTIN 200 MG: 100 CAPSULE ORAL at 20:36

## 2018-03-15 RX ADMIN — ONDANSETRON 4 MG: 2 INJECTION INTRAMUSCULAR; INTRAVENOUS at 16:00

## 2018-03-15 RX ADMIN — CEFAZOLIN SODIUM 2 G: 1 INJECTION, POWDER, FOR SOLUTION INTRAMUSCULAR; INTRAVENOUS at 02:11

## 2018-03-15 RX ADMIN — HYDROMORPHONE HYDROCHLORIDE 2 MG: 2 INJECTION INTRAMUSCULAR; INTRAVENOUS; SUBCUTANEOUS at 07:19

## 2018-03-15 RX ADMIN — OXYCODONE HYDROCHLORIDE AND ACETAMINOPHEN 1 TABLET: 10; 325 TABLET ORAL at 16:00

## 2018-03-15 RX ADMIN — PANTOPRAZOLE SODIUM 40 MG: 40 TABLET, DELAYED RELEASE ORAL at 05:43

## 2018-03-15 RX ADMIN — LEVOTHYROXINE SODIUM 100 MCG: 100 TABLET ORAL at 05:43

## 2018-03-15 RX ADMIN — OXYCODONE HYDROCHLORIDE AND ACETAMINOPHEN 1 TABLET: 7.5; 325 TABLET ORAL at 02:09

## 2018-03-15 RX ADMIN — HYDROMORPHONE HYDROCHLORIDE 2 MG: 2 INJECTION INTRAMUSCULAR; INTRAVENOUS; SUBCUTANEOUS at 02:46

## 2018-03-15 RX ADMIN — NICOTINE 1 PATCH: 21 PATCH, EXTENDED RELEASE TRANSDERMAL at 18:45

## 2018-03-15 RX ADMIN — HYDROMORPHONE HYDROCHLORIDE 2 MG: 2 INJECTION INTRAMUSCULAR; INTRAVENOUS; SUBCUTANEOUS at 11:59

## 2018-03-16 LAB
GLUCOSE BLDC GLUCOMTR-MCNC: 119 MG/DL (ref 70–130)
GLUCOSE BLDC GLUCOMTR-MCNC: 123 MG/DL (ref 70–130)
GLUCOSE BLDC GLUCOMTR-MCNC: 129 MG/DL (ref 70–130)
GLUCOSE BLDC GLUCOMTR-MCNC: 132 MG/DL (ref 70–130)
HCT VFR BLD AUTO: 31.2 % (ref 34.5–44)
HGB BLD-MCNC: 9.8 G/DL (ref 11.5–15.5)

## 2018-03-16 PROCEDURE — 85014 HEMATOCRIT: CPT | Performed by: INTERNAL MEDICINE

## 2018-03-16 PROCEDURE — 97116 GAIT TRAINING THERAPY: CPT

## 2018-03-16 PROCEDURE — 94799 UNLISTED PULMONARY SVC/PX: CPT

## 2018-03-16 PROCEDURE — 85018 HEMOGLOBIN: CPT | Performed by: INTERNAL MEDICINE

## 2018-03-16 PROCEDURE — 82962 GLUCOSE BLOOD TEST: CPT

## 2018-03-16 PROCEDURE — 25010000002 HYDROMORPHONE PER 4 MG: Performed by: ORTHOPAEDIC SURGERY

## 2018-03-16 RX ADMIN — GABAPENTIN 200 MG: 100 CAPSULE ORAL at 20:47

## 2018-03-16 RX ADMIN — ACETAMINOPHEN 650 MG: 325 TABLET ORAL at 17:08

## 2018-03-16 RX ADMIN — BISACODYL 5 MG: 5 TABLET, COATED ORAL at 17:08

## 2018-03-16 RX ADMIN — ONDANSETRON 4 MG: 4 TABLET, FILM COATED ORAL at 04:19

## 2018-03-16 RX ADMIN — HYDROMORPHONE HYDROCHLORIDE 2 MG: 2 INJECTION INTRAMUSCULAR; INTRAVENOUS; SUBCUTANEOUS at 08:55

## 2018-03-16 RX ADMIN — NICOTINE 1 PATCH: 21 PATCH, EXTENDED RELEASE TRANSDERMAL at 17:10

## 2018-03-16 RX ADMIN — OXYCODONE HYDROCHLORIDE AND ACETAMINOPHEN 1 TABLET: 10; 325 TABLET ORAL at 16:30

## 2018-03-16 RX ADMIN — HYDROMORPHONE HYDROCHLORIDE 2 MG: 2 INJECTION INTRAMUSCULAR; INTRAVENOUS; SUBCUTANEOUS at 04:20

## 2018-03-16 RX ADMIN — PANTOPRAZOLE SODIUM 40 MG: 40 TABLET, DELAYED RELEASE ORAL at 05:55

## 2018-03-16 RX ADMIN — SERTRALINE HYDROCHLORIDE 100 MG: 100 TABLET ORAL at 08:56

## 2018-03-16 RX ADMIN — OXYCODONE HYDROCHLORIDE AND ACETAMINOPHEN 1 TABLET: 10; 325 TABLET ORAL at 05:54

## 2018-03-16 RX ADMIN — OXYCODONE HYDROCHLORIDE AND ACETAMINOPHEN 1 TABLET: 10; 325 TABLET ORAL at 20:47

## 2018-03-16 RX ADMIN — HYDROMORPHONE HYDROCHLORIDE 2 MG: 2 INJECTION INTRAMUSCULAR; INTRAVENOUS; SUBCUTANEOUS at 00:24

## 2018-03-16 RX ADMIN — LEVOTHYROXINE SODIUM 100 MCG: 100 TABLET ORAL at 05:55

## 2018-03-17 VITALS
DIASTOLIC BLOOD PRESSURE: 78 MMHG | WEIGHT: 160 LBS | TEMPERATURE: 97.9 F | HEIGHT: 65 IN | HEART RATE: 101 BPM | RESPIRATION RATE: 20 BRPM | OXYGEN SATURATION: 95 % | BODY MASS INDEX: 26.66 KG/M2 | SYSTOLIC BLOOD PRESSURE: 125 MMHG

## 2018-03-17 LAB — GLUCOSE BLDC GLUCOMTR-MCNC: 112 MG/DL (ref 70–130)

## 2018-03-17 PROCEDURE — 82962 GLUCOSE BLOOD TEST: CPT

## 2018-03-17 PROCEDURE — 94799 UNLISTED PULMONARY SVC/PX: CPT

## 2018-03-17 RX ORDER — BISACODYL 5 MG/1
5 TABLET, DELAYED RELEASE ORAL DAILY PRN
Status: ON HOLD
Start: 2018-03-17 | End: 2018-08-16

## 2018-03-17 RX ORDER — HYDROCODONE BITARTRATE AND ACETAMINOPHEN 10; 325 MG/1; MG/1
1 TABLET ORAL EVERY 4 HOURS PRN
Qty: 50 TABLET | Refills: 0 | Status: ON HOLD
Start: 2018-03-17 | End: 2018-08-16

## 2018-03-17 RX ADMIN — PANTOPRAZOLE SODIUM 40 MG: 40 TABLET, DELAYED RELEASE ORAL at 05:32

## 2018-03-17 RX ADMIN — OXYCODONE HYDROCHLORIDE AND ACETAMINOPHEN 1 TABLET: 10; 325 TABLET ORAL at 13:01

## 2018-03-17 RX ADMIN — LEVOTHYROXINE SODIUM 100 MCG: 100 TABLET ORAL at 05:32

## 2018-03-17 RX ADMIN — OXYCODONE HYDROCHLORIDE AND ACETAMINOPHEN 1 TABLET: 10; 325 TABLET ORAL at 05:42

## 2018-03-17 RX ADMIN — OXYCODONE HYDROCHLORIDE AND ACETAMINOPHEN 1 TABLET: 10; 325 TABLET ORAL at 01:36

## 2018-03-17 RX ADMIN — OXYCODONE HYDROCHLORIDE AND ACETAMINOPHEN 1 TABLET: 10; 325 TABLET ORAL at 09:12

## 2018-03-17 RX ADMIN — SERTRALINE HYDROCHLORIDE 100 MG: 100 TABLET ORAL at 09:08

## 2018-08-15 ENCOUNTER — APPOINTMENT (OUTPATIENT)
Dept: PREADMISSION TESTING | Facility: HOSPITAL | Age: 58
End: 2018-08-15

## 2018-08-15 VITALS — HEIGHT: 65 IN | BODY MASS INDEX: 26.92 KG/M2 | WEIGHT: 161.6 LBS

## 2018-08-15 LAB
DEPRECATED RDW RBC AUTO: 48.1 FL (ref 37–54)
ERYTHROCYTE [DISTWIDTH] IN BLOOD BY AUTOMATED COUNT: 13.8 % (ref 11.3–14.5)
HCT VFR BLD AUTO: 37.3 % (ref 34.5–44)
HGB BLD-MCNC: 11.9 G/DL (ref 11.5–15.5)
MCH RBC QN AUTO: 30.7 PG (ref 27–31)
MCHC RBC AUTO-ENTMCNC: 31.9 G/DL (ref 32–36)
MCV RBC AUTO: 96.1 FL (ref 80–99)
PLATELET # BLD AUTO: 241 10*3/MM3 (ref 150–450)
PMV BLD AUTO: 10.8 FL (ref 6–12)
RBC # BLD AUTO: 3.88 10*6/MM3 (ref 3.89–5.14)
WBC NRBC COR # BLD: 9.13 10*3/MM3 (ref 3.5–10.8)

## 2018-08-15 PROCEDURE — 85027 COMPLETE CBC AUTOMATED: CPT | Performed by: ANESTHESIOLOGY

## 2018-08-15 PROCEDURE — 36415 COLL VENOUS BLD VENIPUNCTURE: CPT

## 2018-08-15 RX ORDER — SUCRALFATE 1 G/1
1 TABLET ORAL 3 TIMES DAILY
COMMUNITY

## 2018-08-15 NOTE — DISCHARGE INSTRUCTIONS

## 2018-08-16 ENCOUNTER — ANESTHESIA (OUTPATIENT)
Dept: PERIOP | Facility: HOSPITAL | Age: 58
End: 2018-08-16

## 2018-08-16 ENCOUNTER — APPOINTMENT (OUTPATIENT)
Dept: GENERAL RADIOLOGY | Facility: HOSPITAL | Age: 58
End: 2018-08-16

## 2018-08-16 ENCOUNTER — ANESTHESIA EVENT (OUTPATIENT)
Dept: PERIOP | Facility: HOSPITAL | Age: 58
End: 2018-08-16

## 2018-08-16 ENCOUNTER — HOSPITAL ENCOUNTER (OUTPATIENT)
Facility: HOSPITAL | Age: 58
Discharge: HOME | End: 2018-08-17
Attending: ORTHOPAEDIC SURGERY | Admitting: ORTHOPAEDIC SURGERY

## 2018-08-16 PROBLEM — Z98.1 S/P CERVICAL SPINAL FUSION: Status: ACTIVE | Noted: 2018-08-16

## 2018-08-16 PROBLEM — M54.2 NECK PAIN: Status: ACTIVE | Noted: 2018-08-16

## 2018-08-16 LAB
GLUCOSE BLDC GLUCOMTR-MCNC: 138 MG/DL (ref 70–130)
GLUCOSE BLDC GLUCOMTR-MCNC: 156 MG/DL (ref 70–130)
GLUCOSE BLDC GLUCOMTR-MCNC: 166 MG/DL (ref 70–130)
GLUCOSE BLDC GLUCOMTR-MCNC: 167 MG/DL (ref 70–130)

## 2018-08-16 PROCEDURE — 25010000002 NEOSTIGMINE 10 MG/10ML SOLUTION: Performed by: NURSE ANESTHETIST, CERTIFIED REGISTERED

## 2018-08-16 PROCEDURE — 63710000001 DEXAMETHASONE PER 0.25 MG: Performed by: ORTHOPAEDIC SURGERY

## 2018-08-16 PROCEDURE — 63710000001 INSULIN LISPRO (HUMAN) PER 5 UNITS: Performed by: NURSE PRACTITIONER

## 2018-08-16 PROCEDURE — 25810000003 SODIUM CHLORIDE 0.9 % WITH KCL 20 MEQ 20-0.9 MEQ/L-% SOLUTION: Performed by: ORTHOPAEDIC SURGERY

## 2018-08-16 PROCEDURE — 25010000002 PROPOFOL 10 MG/ML EMULSION: Performed by: NURSE ANESTHETIST, CERTIFIED REGISTERED

## 2018-08-16 PROCEDURE — 25010000002 PROMETHAZINE PER 50 MG: Performed by: NURSE ANESTHETIST, CERTIFIED REGISTERED

## 2018-08-16 PROCEDURE — 25010000002 DEXAMETHASONE PER 1 MG: Performed by: ORTHOPAEDIC SURGERY

## 2018-08-16 PROCEDURE — C1713 ANCHOR/SCREW BN/BN,TIS/BN: HCPCS | Performed by: ORTHOPAEDIC SURGERY

## 2018-08-16 PROCEDURE — 76000 FLUOROSCOPY <1 HR PHYS/QHP: CPT

## 2018-08-16 PROCEDURE — 25010000002 DEXAMETHASONE PER 1 MG: Performed by: NURSE ANESTHETIST, CERTIFIED REGISTERED

## 2018-08-16 PROCEDURE — 25010000003 CEFAZOLIN IN DEXTROSE 2-4 GM/100ML-% SOLUTION: Performed by: ORTHOPAEDIC SURGERY

## 2018-08-16 PROCEDURE — 76001 HC FLUORO GREATER THAN 1 HOUR: CPT

## 2018-08-16 PROCEDURE — L0120 CERV FLEX N/ADJ FOAM PRE OTS: HCPCS | Performed by: ORTHOPAEDIC SURGERY

## 2018-08-16 PROCEDURE — 25010000002 HYDROMORPHONE PER 4 MG: Performed by: NURSE ANESTHETIST, CERTIFIED REGISTERED

## 2018-08-16 PROCEDURE — 82962 GLUCOSE BLOOD TEST: CPT

## 2018-08-16 PROCEDURE — 25010000002 FENTANYL CITRATE (PF) 100 MCG/2ML SOLUTION: Performed by: NURSE ANESTHETIST, CERTIFIED REGISTERED

## 2018-08-16 PROCEDURE — 25010000002 HYDROMORPHONE PER 4 MG: Performed by: ORTHOPAEDIC SURGERY

## 2018-08-16 PROCEDURE — 25010000002 ONDANSETRON PER 1 MG: Performed by: NURSE ANESTHETIST, CERTIFIED REGISTERED

## 2018-08-16 DEVICE — ALLOGRFT BONE VIVIGEN CELLUAR MATRX FORMABLE 1CC: Type: IMPLANTABLE DEVICE | Site: SPINE CERVICAL | Status: FUNCTIONAL

## 2018-08-16 DEVICE — PLT SKYLINE 2LVL 28MM: Type: IMPLANTABLE DEVICE | Site: SPINE CERVICAL | Status: FUNCTIONAL

## 2018-08-16 DEVICE — SCRW SKYLINE VAR SD 14MM: Type: IMPLANTABLE DEVICE | Site: SPINE CERVICAL | Status: FUNCTIONAL

## 2018-08-16 DEVICE — BENGAL SYSTEM STANDARD IMPLANT 5MM, 7 DEGREES
Type: IMPLANTABLE DEVICE | Site: SPINE CERVICAL | Status: FUNCTIONAL
Brand: BENGAL

## 2018-08-16 DEVICE — SCRW SKYLINE VAR SD 13MM: Type: IMPLANTABLE DEVICE | Site: SPINE CERVICAL | Status: FUNCTIONAL

## 2018-08-16 RX ORDER — BISACODYL 10 MG
10 SUPPOSITORY, RECTAL RECTAL DAILY PRN
Status: DISCONTINUED | OUTPATIENT
Start: 2018-08-16 | End: 2018-08-17 | Stop reason: HOSPADM

## 2018-08-16 RX ORDER — HYDROCODONE BITARTRATE AND ACETAMINOPHEN 7.5; 325 MG/1; MG/1
1 TABLET ORAL ONCE AS NEEDED
Status: COMPLETED | OUTPATIENT
Start: 2018-08-16 | End: 2018-08-16

## 2018-08-16 RX ORDER — OMEPRAZOLE 20 MG/1
20 CAPSULE, DELAYED RELEASE ORAL DAILY
Status: DISCONTINUED | OUTPATIENT
Start: 2018-08-17 | End: 2018-08-17 | Stop reason: HOSPADM

## 2018-08-16 RX ORDER — HYDROMORPHONE HCL 110MG/55ML
2 PATIENT CONTROLLED ANALGESIA SYRINGE INTRAVENOUS EVERY 4 HOURS PRN
Status: DISCONTINUED | OUTPATIENT
Start: 2018-08-16 | End: 2018-08-17 | Stop reason: HOSPADM

## 2018-08-16 RX ORDER — HYDROMORPHONE HYDROCHLORIDE 1 MG/ML
0.5 INJECTION, SOLUTION INTRAMUSCULAR; INTRAVENOUS; SUBCUTANEOUS
Status: DISCONTINUED | OUTPATIENT
Start: 2018-08-16 | End: 2018-08-16 | Stop reason: HOSPADM

## 2018-08-16 RX ORDER — ONDANSETRON 2 MG/ML
4 INJECTION INTRAMUSCULAR; INTRAVENOUS ONCE AS NEEDED
Status: DISCONTINUED | OUTPATIENT
Start: 2018-08-16 | End: 2018-08-16 | Stop reason: HOSPADM

## 2018-08-16 RX ORDER — PROMETHAZINE HYDROCHLORIDE 25 MG/1
25 TABLET ORAL ONCE AS NEEDED
Status: COMPLETED | OUTPATIENT
Start: 2018-08-16 | End: 2018-08-16

## 2018-08-16 RX ORDER — PROMETHAZINE HYDROCHLORIDE 25 MG/ML
6.25 INJECTION, SOLUTION INTRAMUSCULAR; INTRAVENOUS ONCE AS NEEDED
Status: COMPLETED | OUTPATIENT
Start: 2018-08-16 | End: 2018-08-16

## 2018-08-16 RX ORDER — PROMETHAZINE HYDROCHLORIDE 25 MG/1
25 SUPPOSITORY RECTAL ONCE AS NEEDED
Status: COMPLETED | OUTPATIENT
Start: 2018-08-16 | End: 2018-08-16

## 2018-08-16 RX ORDER — MEPERIDINE HYDROCHLORIDE 25 MG/ML
12.5 INJECTION INTRAMUSCULAR; INTRAVENOUS; SUBCUTANEOUS
Status: COMPLETED | OUTPATIENT
Start: 2018-08-16 | End: 2018-08-16

## 2018-08-16 RX ORDER — IPRATROPIUM BROMIDE AND ALBUTEROL SULFATE 2.5; .5 MG/3ML; MG/3ML
3 SOLUTION RESPIRATORY (INHALATION) ONCE AS NEEDED
Status: DISCONTINUED | OUTPATIENT
Start: 2018-08-16 | End: 2018-08-16 | Stop reason: HOSPADM

## 2018-08-16 RX ORDER — SODIUM CHLORIDE AND POTASSIUM CHLORIDE 150; 900 MG/100ML; MG/100ML
100 INJECTION, SOLUTION INTRAVENOUS CONTINUOUS
Status: DISCONTINUED | OUTPATIENT
Start: 2018-08-16 | End: 2018-08-17 | Stop reason: HOSPADM

## 2018-08-16 RX ORDER — LEVOTHYROXINE SODIUM 0.1 MG/1
100 TABLET ORAL
Status: DISCONTINUED | OUTPATIENT
Start: 2018-08-17 | End: 2018-08-17 | Stop reason: HOSPADM

## 2018-08-16 RX ORDER — ONDANSETRON 2 MG/ML
4 INJECTION INTRAMUSCULAR; INTRAVENOUS EVERY 6 HOURS PRN
Status: DISCONTINUED | OUTPATIENT
Start: 2018-08-16 | End: 2018-08-17 | Stop reason: HOSPADM

## 2018-08-16 RX ORDER — OXYCODONE HCL 10 MG/1
10 TABLET, FILM COATED, EXTENDED RELEASE ORAL ONCE
Status: COMPLETED | OUTPATIENT
Start: 2018-08-16 | End: 2018-08-16

## 2018-08-16 RX ORDER — BISACODYL 5 MG/1
5 TABLET, DELAYED RELEASE ORAL DAILY PRN
Status: DISCONTINUED | OUTPATIENT
Start: 2018-08-16 | End: 2018-08-17 | Stop reason: HOSPADM

## 2018-08-16 RX ORDER — ACETAMINOPHEN 325 MG/1
650 TABLET ORAL EVERY 4 HOURS PRN
Status: DISCONTINUED | OUTPATIENT
Start: 2018-08-16 | End: 2018-08-17 | Stop reason: HOSPADM

## 2018-08-16 RX ORDER — NICOTINE 21 MG/24HR
1 PATCH, TRANSDERMAL 24 HOURS TRANSDERMAL
Status: DISCONTINUED | OUTPATIENT
Start: 2018-08-16 | End: 2018-08-17 | Stop reason: HOSPADM

## 2018-08-16 RX ORDER — SODIUM CHLORIDE 0.9 % (FLUSH) 0.9 %
1-10 SYRINGE (ML) INJECTION AS NEEDED
Status: DISCONTINUED | OUTPATIENT
Start: 2018-08-16 | End: 2018-08-16

## 2018-08-16 RX ORDER — FAMOTIDINE 20 MG/1
20 TABLET, FILM COATED ORAL
Status: DISCONTINUED | OUTPATIENT
Start: 2018-08-16 | End: 2018-08-16 | Stop reason: SDUPTHER

## 2018-08-16 RX ORDER — DEXTROSE MONOHYDRATE 25 G/50ML
25 INJECTION, SOLUTION INTRAVENOUS
Status: DISCONTINUED | OUTPATIENT
Start: 2018-08-16 | End: 2018-08-17 | Stop reason: HOSPADM

## 2018-08-16 RX ORDER — SUCRALFATE 1 G/1
1 TABLET ORAL 3 TIMES DAILY
Status: DISCONTINUED | OUTPATIENT
Start: 2018-08-16 | End: 2018-08-17 | Stop reason: HOSPADM

## 2018-08-16 RX ORDER — PROPOFOL 10 MG/ML
VIAL (ML) INTRAVENOUS AS NEEDED
Status: DISCONTINUED | OUTPATIENT
Start: 2018-08-16 | End: 2018-08-16 | Stop reason: SURG

## 2018-08-16 RX ORDER — FAMOTIDINE 10 MG/ML
20 INJECTION, SOLUTION INTRAVENOUS ONCE
Status: DISCONTINUED | OUTPATIENT
Start: 2018-08-16 | End: 2018-08-16

## 2018-08-16 RX ORDER — FAMOTIDINE 20 MG/1
20 TABLET, FILM COATED ORAL EVERY 12 HOURS SCHEDULED
Status: DISCONTINUED | OUTPATIENT
Start: 2018-08-16 | End: 2018-08-17 | Stop reason: HOSPADM

## 2018-08-16 RX ORDER — FAMOTIDINE 20 MG/1
20 TABLET, FILM COATED ORAL ONCE
Status: COMPLETED | OUTPATIENT
Start: 2018-08-16 | End: 2018-08-16

## 2018-08-16 RX ORDER — SODIUM CHLORIDE 0.9 % (FLUSH) 0.9 %
1-10 SYRINGE (ML) INJECTION AS NEEDED
Status: DISCONTINUED | OUTPATIENT
Start: 2018-08-16 | End: 2018-08-17 | Stop reason: HOSPADM

## 2018-08-16 RX ORDER — DEXAMETHASONE SODIUM PHOSPHATE 4 MG/ML
INJECTION, SOLUTION INTRA-ARTICULAR; INTRALESIONAL; INTRAMUSCULAR; INTRAVENOUS; SOFT TISSUE AS NEEDED
Status: DISCONTINUED | OUTPATIENT
Start: 2018-08-16 | End: 2018-08-16 | Stop reason: SURG

## 2018-08-16 RX ORDER — LABETALOL HYDROCHLORIDE 5 MG/ML
5 INJECTION, SOLUTION INTRAVENOUS
Status: DISCONTINUED | OUTPATIENT
Start: 2018-08-16 | End: 2018-08-16 | Stop reason: HOSPADM

## 2018-08-16 RX ORDER — LIDOCAINE HYDROCHLORIDE 10 MG/ML
0.5 INJECTION, SOLUTION EPIDURAL; INFILTRATION; INTRACAUDAL; PERINEURAL ONCE AS NEEDED
Status: DISCONTINUED | OUTPATIENT
Start: 2018-08-16 | End: 2018-08-16

## 2018-08-16 RX ORDER — SERTRALINE HYDROCHLORIDE 100 MG/1
100 TABLET, FILM COATED ORAL DAILY
Status: DISCONTINUED | OUTPATIENT
Start: 2018-08-17 | End: 2018-08-17 | Stop reason: HOSPADM

## 2018-08-16 RX ORDER — PREGABALIN 75 MG/1
75 CAPSULE ORAL ONCE
Status: COMPLETED | OUTPATIENT
Start: 2018-08-16 | End: 2018-08-16

## 2018-08-16 RX ORDER — SODIUM CHLORIDE 0.9 % (FLUSH) 0.9 %
1-10 SYRINGE (ML) INJECTION AS NEEDED
Status: DISCONTINUED | OUTPATIENT
Start: 2018-08-16 | End: 2018-08-16 | Stop reason: HOSPADM

## 2018-08-16 RX ORDER — ATRACURIUM BESYLATE 10 MG/ML
INJECTION, SOLUTION INTRAVENOUS AS NEEDED
Status: DISCONTINUED | OUTPATIENT
Start: 2018-08-16 | End: 2018-08-16 | Stop reason: SURG

## 2018-08-16 RX ORDER — FAMOTIDINE 10 MG/ML
20 INJECTION, SOLUTION INTRAVENOUS EVERY 12 HOURS SCHEDULED
Status: DISCONTINUED | OUTPATIENT
Start: 2018-08-16 | End: 2018-08-17 | Stop reason: HOSPADM

## 2018-08-16 RX ORDER — LIDOCAINE HYDROCHLORIDE 10 MG/ML
0.5 INJECTION, SOLUTION EPIDURAL; INFILTRATION; INTRACAUDAL; PERINEURAL ONCE AS NEEDED
Status: COMPLETED | OUTPATIENT
Start: 2018-08-16 | End: 2018-08-16

## 2018-08-16 RX ORDER — SODIUM CHLORIDE, SODIUM LACTATE, POTASSIUM CHLORIDE, CALCIUM CHLORIDE 600; 310; 30; 20 MG/100ML; MG/100ML; MG/100ML; MG/100ML
9 INJECTION, SOLUTION INTRAVENOUS CONTINUOUS
Status: DISCONTINUED | OUTPATIENT
Start: 2018-08-16 | End: 2018-08-17 | Stop reason: HOSPADM

## 2018-08-16 RX ORDER — SODIUM CHLORIDE, SODIUM LACTATE, POTASSIUM CHLORIDE, CALCIUM CHLORIDE 600; 310; 30; 20 MG/100ML; MG/100ML; MG/100ML; MG/100ML
9 INJECTION, SOLUTION INTRAVENOUS CONTINUOUS PRN
Status: DISCONTINUED | OUTPATIENT
Start: 2018-08-16 | End: 2018-08-16 | Stop reason: HOSPADM

## 2018-08-16 RX ORDER — DEXAMETHASONE 4 MG/1
4 TABLET ORAL EVERY 6 HOURS SCHEDULED
Status: DISCONTINUED | OUTPATIENT
Start: 2018-08-16 | End: 2018-08-17 | Stop reason: HOSPADM

## 2018-08-16 RX ORDER — DEXAMETHASONE SODIUM PHOSPHATE 4 MG/ML
4 INJECTION, SOLUTION INTRA-ARTICULAR; INTRALESIONAL; INTRAMUSCULAR; INTRAVENOUS; SOFT TISSUE EVERY 6 HOURS SCHEDULED
Status: DISCONTINUED | OUTPATIENT
Start: 2018-08-16 | End: 2018-08-17 | Stop reason: HOSPADM

## 2018-08-16 RX ORDER — FENTANYL CITRATE 50 UG/ML
INJECTION, SOLUTION INTRAMUSCULAR; INTRAVENOUS AS NEEDED
Status: DISCONTINUED | OUTPATIENT
Start: 2018-08-16 | End: 2018-08-16 | Stop reason: SURG

## 2018-08-16 RX ORDER — NEOSTIGMINE METHYLSULFATE 1 MG/ML
INJECTION, SOLUTION INTRAVENOUS AS NEEDED
Status: DISCONTINUED | OUTPATIENT
Start: 2018-08-16 | End: 2018-08-16 | Stop reason: SURG

## 2018-08-16 RX ORDER — NALOXONE HCL 0.4 MG/ML
0.4 VIAL (ML) INJECTION AS NEEDED
Status: DISCONTINUED | OUTPATIENT
Start: 2018-08-16 | End: 2018-08-16 | Stop reason: HOSPADM

## 2018-08-16 RX ORDER — NICOTINE POLACRILEX 4 MG
15 LOZENGE BUCCAL
Status: DISCONTINUED | OUTPATIENT
Start: 2018-08-16 | End: 2018-08-17 | Stop reason: HOSPADM

## 2018-08-16 RX ORDER — LIDOCAINE HYDROCHLORIDE 10 MG/ML
INJECTION, SOLUTION EPIDURAL; INFILTRATION; INTRACAUDAL; PERINEURAL AS NEEDED
Status: DISCONTINUED | OUTPATIENT
Start: 2018-08-16 | End: 2018-08-16 | Stop reason: SURG

## 2018-08-16 RX ORDER — NALOXONE HCL 0.4 MG/ML
0.4 VIAL (ML) INJECTION
Status: DISCONTINUED | OUTPATIENT
Start: 2018-08-16 | End: 2018-08-17 | Stop reason: HOSPADM

## 2018-08-16 RX ORDER — ZOLPIDEM TARTRATE 5 MG/1
5 TABLET ORAL NIGHTLY PRN
Status: DISCONTINUED | OUTPATIENT
Start: 2018-08-16 | End: 2018-08-17 | Stop reason: HOSPADM

## 2018-08-16 RX ORDER — FENTANYL CITRATE 50 UG/ML
50 INJECTION, SOLUTION INTRAMUSCULAR; INTRAVENOUS
Status: DISCONTINUED | OUTPATIENT
Start: 2018-08-16 | End: 2018-08-16 | Stop reason: HOSPADM

## 2018-08-16 RX ORDER — HYDROCODONE BITARTRATE AND ACETAMINOPHEN 5; 325 MG/1; MG/1
1 TABLET ORAL ONCE AS NEEDED
Status: DISCONTINUED | OUTPATIENT
Start: 2018-08-16 | End: 2018-08-16 | Stop reason: HOSPADM

## 2018-08-16 RX ORDER — LABETALOL HYDROCHLORIDE 5 MG/ML
10 INJECTION, SOLUTION INTRAVENOUS EVERY 4 HOURS PRN
Status: DISCONTINUED | OUTPATIENT
Start: 2018-08-16 | End: 2018-08-17 | Stop reason: HOSPADM

## 2018-08-16 RX ORDER — BENZOIN/ALOE VERA/STORAX/TOLU 10-2-8-4%
TINCTURE TOPICAL AS NEEDED
Status: DISCONTINUED | OUTPATIENT
Start: 2018-08-16 | End: 2018-08-16 | Stop reason: HOSPADM

## 2018-08-16 RX ORDER — CELECOXIB 200 MG/1
200 CAPSULE ORAL ONCE
Status: COMPLETED | OUTPATIENT
Start: 2018-08-16 | End: 2018-08-16

## 2018-08-16 RX ORDER — CEFAZOLIN SODIUM 2 G/100ML
2 INJECTION, SOLUTION INTRAVENOUS EVERY 8 HOURS
Status: COMPLETED | OUTPATIENT
Start: 2018-08-16 | End: 2018-08-16

## 2018-08-16 RX ORDER — ONDANSETRON 2 MG/ML
INJECTION INTRAMUSCULAR; INTRAVENOUS AS NEEDED
Status: DISCONTINUED | OUTPATIENT
Start: 2018-08-16 | End: 2018-08-16 | Stop reason: SURG

## 2018-08-16 RX ORDER — GLYCOPYRROLATE 0.2 MG/ML
INJECTION INTRAMUSCULAR; INTRAVENOUS AS NEEDED
Status: DISCONTINUED | OUTPATIENT
Start: 2018-08-16 | End: 2018-08-16 | Stop reason: SURG

## 2018-08-16 RX ORDER — CEFAZOLIN SODIUM 2 G/100ML
2 INJECTION, SOLUTION INTRAVENOUS ONCE
Status: COMPLETED | OUTPATIENT
Start: 2018-08-16 | End: 2018-08-16

## 2018-08-16 RX ORDER — OXYCODONE AND ACETAMINOPHEN 7.5; 325 MG/1; MG/1
1 TABLET ORAL EVERY 4 HOURS PRN
Status: DISCONTINUED | OUTPATIENT
Start: 2018-08-16 | End: 2018-08-17

## 2018-08-16 RX ORDER — MAGNESIUM HYDROXIDE 1200 MG/15ML
LIQUID ORAL AS NEEDED
Status: DISCONTINUED | OUTPATIENT
Start: 2018-08-16 | End: 2018-08-16 | Stop reason: HOSPADM

## 2018-08-16 RX ORDER — ACETAMINOPHEN 500 MG
1000 TABLET ORAL ONCE
Status: COMPLETED | OUTPATIENT
Start: 2018-08-16 | End: 2018-08-16

## 2018-08-16 RX ADMIN — HYDROMORPHONE HYDROCHLORIDE 0.5 MG: 1 INJECTION, SOLUTION INTRAMUSCULAR; INTRAVENOUS; SUBCUTANEOUS at 11:20

## 2018-08-16 RX ADMIN — OXYCODONE HYDROCHLORIDE AND ACETAMINOPHEN 1 TABLET: 7.5; 325 TABLET ORAL at 18:57

## 2018-08-16 RX ADMIN — FAMOTIDINE 20 MG: 10 INJECTION, SOLUTION INTRAVENOUS at 13:36

## 2018-08-16 RX ADMIN — LIDOCAINE HYDROCHLORIDE 40 MG: 10 INJECTION, SOLUTION EPIDURAL; INFILTRATION; INTRACAUDAL; PERINEURAL at 08:10

## 2018-08-16 RX ADMIN — EPHEDRINE SULFATE 10 MG: 50 INJECTION INTRAMUSCULAR; INTRAVENOUS; SUBCUTANEOUS at 08:35

## 2018-08-16 RX ADMIN — PREGABALIN 75 MG: 75 CAPSULE ORAL at 06:58

## 2018-08-16 RX ADMIN — FENTANYL CITRATE 25 MCG: 50 INJECTION, SOLUTION INTRAMUSCULAR; INTRAVENOUS at 09:50

## 2018-08-16 RX ADMIN — ACETAMINOPHEN 1000 MG: 500 TABLET, FILM COATED ORAL at 06:58

## 2018-08-16 RX ADMIN — HYDROMORPHONE HYDROCHLORIDE 2 MG: 2 INJECTION, SOLUTION INTRAMUSCULAR; INTRAVENOUS; SUBCUTANEOUS at 16:09

## 2018-08-16 RX ADMIN — NICOTINE 1 PATCH: 21 PATCH, EXTENDED RELEASE TRANSDERMAL at 13:16

## 2018-08-16 RX ADMIN — ATRACURIUM BESYLATE 35 MG: 10 INJECTION, SOLUTION INTRAVENOUS at 08:10

## 2018-08-16 RX ADMIN — HYDROMORPHONE HYDROCHLORIDE 0.5 MG: 1 INJECTION, SOLUTION INTRAMUSCULAR; INTRAVENOUS; SUBCUTANEOUS at 11:11

## 2018-08-16 RX ADMIN — CEFAZOLIN SODIUM 2 G: 2 INJECTION, SOLUTION INTRAVENOUS at 16:02

## 2018-08-16 RX ADMIN — EPHEDRINE SULFATE 10 MG: 50 INJECTION INTRAMUSCULAR; INTRAVENOUS; SUBCUTANEOUS at 08:40

## 2018-08-16 RX ADMIN — PROPOFOL 160 MG: 10 INJECTION, EMULSION INTRAVENOUS at 08:10

## 2018-08-16 RX ADMIN — MEPERIDINE HYDROCHLORIDE 12.5 MG: 25 INJECTION INTRAMUSCULAR; INTRAVENOUS; SUBCUTANEOUS at 11:10

## 2018-08-16 RX ADMIN — CEFAZOLIN SODIUM 2 G: 2 INJECTION, SOLUTION INTRAVENOUS at 08:07

## 2018-08-16 RX ADMIN — HYDROCODONE BITARTRATE AND ACETAMINOPHEN 1 TABLET: 7.5; 325 TABLET ORAL at 12:17

## 2018-08-16 RX ADMIN — HYDROMORPHONE HYDROCHLORIDE 2 MG: 2 INJECTION, SOLUTION INTRAMUSCULAR; INTRAVENOUS; SUBCUTANEOUS at 20:40

## 2018-08-16 RX ADMIN — MEPERIDINE HYDROCHLORIDE 12.5 MG: 25 INJECTION INTRAMUSCULAR; INTRAVENOUS; SUBCUTANEOUS at 11:05

## 2018-08-16 RX ADMIN — SODIUM CHLORIDE, POTASSIUM CHLORIDE, SODIUM LACTATE AND CALCIUM CHLORIDE: 600; 310; 30; 20 INJECTION, SOLUTION INTRAVENOUS at 08:06

## 2018-08-16 RX ADMIN — CEFAZOLIN SODIUM 2 G: 2 INJECTION, SOLUTION INTRAVENOUS at 23:03

## 2018-08-16 RX ADMIN — FAMOTIDINE 20 MG: 20 TABLET ORAL at 20:36

## 2018-08-16 RX ADMIN — OXYCODONE HYDROCHLORIDE 10 MG: 10 TABLET, FILM COATED, EXTENDED RELEASE ORAL at 06:58

## 2018-08-16 RX ADMIN — PROMETHAZINE HYDROCHLORIDE 6.25 MG: 25 INJECTION INTRAMUSCULAR; INTRAVENOUS at 11:20

## 2018-08-16 RX ADMIN — DEXAMETHASONE 4 MG: 4 TABLET ORAL at 17:45

## 2018-08-16 RX ADMIN — FAMOTIDINE 20 MG: 20 TABLET ORAL at 06:59

## 2018-08-16 RX ADMIN — EPHEDRINE SULFATE 5 MG: 50 INJECTION INTRAMUSCULAR; INTRAVENOUS; SUBCUTANEOUS at 08:33

## 2018-08-16 RX ADMIN — POTASSIUM CHLORIDE AND SODIUM CHLORIDE 100 ML/HR: 900; 150 INJECTION, SOLUTION INTRAVENOUS at 13:02

## 2018-08-16 RX ADMIN — FENTANYL CITRATE 25 MCG: 50 INJECTION, SOLUTION INTRAMUSCULAR; INTRAVENOUS at 08:10

## 2018-08-16 RX ADMIN — SUCRALFATE 1 G: 1 TABLET ORAL at 16:02

## 2018-08-16 RX ADMIN — OXYCODONE HYDROCHLORIDE AND ACETAMINOPHEN 1 TABLET: 7.5; 325 TABLET ORAL at 23:03

## 2018-08-16 RX ADMIN — NEOSTIGMINE METHYLSULFATE 2 MG: 1 INJECTION, SOLUTION INTRAVENOUS at 10:28

## 2018-08-16 RX ADMIN — FENTANYL CITRATE 25 MCG: 50 INJECTION, SOLUTION INTRAMUSCULAR; INTRAVENOUS at 08:50

## 2018-08-16 RX ADMIN — GLYCOPYRROLATE 0.4 MG: 0.2 INJECTION, SOLUTION INTRAMUSCULAR; INTRAVENOUS at 10:28

## 2018-08-16 RX ADMIN — MUPIROCIN 10 APPLICATION: 20 OINTMENT TOPICAL at 06:59

## 2018-08-16 RX ADMIN — CELECOXIB 200 MG: 200 CAPSULE ORAL at 06:58

## 2018-08-16 RX ADMIN — ONDANSETRON 4 MG: 2 INJECTION INTRAMUSCULAR; INTRAVENOUS at 10:17

## 2018-08-16 RX ADMIN — OXYCODONE HYDROCHLORIDE AND ACETAMINOPHEN 1 TABLET: 7.5; 325 TABLET ORAL at 13:02

## 2018-08-16 RX ADMIN — LIDOCAINE HYDROCHLORIDE 0.5 ML: 10 INJECTION, SOLUTION EPIDURAL; INFILTRATION; INTRACAUDAL; PERINEURAL at 06:59

## 2018-08-16 RX ADMIN — FENTANYL CITRATE 25 MCG: 50 INJECTION, SOLUTION INTRAMUSCULAR; INTRAVENOUS at 10:43

## 2018-08-16 RX ADMIN — SODIUM CHLORIDE, POTASSIUM CHLORIDE, SODIUM LACTATE AND CALCIUM CHLORIDE 9 ML/HR: 600; 310; 30; 20 INJECTION, SOLUTION INTRAVENOUS at 06:59

## 2018-08-16 RX ADMIN — DEXAMETHASONE SODIUM PHOSPHATE 4 MG: 4 INJECTION, SOLUTION INTRAMUSCULAR; INTRAVENOUS at 23:03

## 2018-08-16 RX ADMIN — DEXAMETHASONE SODIUM PHOSPHATE 8 MG: 4 INJECTION, SOLUTION INTRAMUSCULAR; INTRAVENOUS at 08:10

## 2018-08-16 RX ADMIN — DEXAMETHASONE SODIUM PHOSPHATE 4 MG: 4 INJECTION, SOLUTION INTRAMUSCULAR; INTRAVENOUS at 13:36

## 2018-08-16 NOTE — ANESTHESIA PREPROCEDURE EVALUATION
Anesthesia Evaluation     Patient summary reviewed and Nursing notes reviewed                Airway   Mallampati: II  TM distance: >3 FB  Neck ROM: full  No difficulty expected  Dental - normal exam     Pulmonary - normal exam   (+) a smoker Current,   Cardiovascular - negative cardio ROS and normal exam        Neuro/Psych- negative ROS  GI/Hepatic/Renal/Endo    (+)  GERD,  diabetes mellitus well controlled, hypothyroidism,     Musculoskeletal     Abdominal  - normal exam    Bowel sounds: normal.   Substance History - negative use     OB/GYN negative ob/gyn ROS         Other   (+) arthritis                     Anesthesia Plan    ASA 3     general     intravenous induction   Anesthetic plan and risks discussed with patient.    Plan discussed with CRNA.

## 2018-08-16 NOTE — ANESTHESIA PROCEDURE NOTES
Airway  Urgency: elective    Airway not difficult    General Information and Staff    Patient location during procedure: OR  CRNA: BRIAN BAI    Indications and Patient Condition  Indications for airway management: airway protection    Preoxygenated: yes  MILS not maintained throughout  Mask difficulty assessment: 1 - vent by mask    Final Airway Details  Final airway type: endotracheal airway      Successful airway: ETT  Cuffed: yes   Successful intubation technique: direct laryngoscopy  Endotracheal tube insertion site: oral  Blade: Earl  Blade size: #3  ETT size: 7.0 mm  Cormack-Lehane Classification: grade IIa - partial view of glottis  Placement verified by: chest auscultation and capnometry   Cuff volume (mL): 7  Measured from: lips  ETT to lips (cm): 20  Number of attempts at approach: 1    Additional Comments  Negative epigastric sounds, Breath sound equal bilaterally with symmetric chest rise and fall. No trauma.

## 2018-08-16 NOTE — ANESTHESIA POSTPROCEDURE EVALUATION
Patient: Pretty Squires    Procedure Summary     Date:  08/16/18 Room / Location:   [facility] [address] [place] [address] [facility]    Anesthesia Start:  0806 Anesthesia Stop:  1058    Procedure:  CERVICAL DECOMPRESSION ANTERIOR WITH FUSION AND INSTRUMENTATION (N/A Spine Cervical) Diagnosis:      Surgeon:  Jose Maria Garcia MD Provider:  Asad Arce MD    Anesthesia Type:  general ASA Status:  3          Anesthesia Type: general  Last vitals  BP   107/89   Temp   97.9 °F (36.6 °C) (08/16/18 0656)   Pulse 91   Resp 14   SpO2 92%     Post Anesthesia Care and Evaluation    Patient location during evaluation: PACU  Patient participation: complete - patient participated  Level of consciousness: awake  Pain score: 0  Pain management: adequate  Airway patency: patent  Anesthetic complications: No anesthetic complications  PONV Status: none  Cardiovascular status: hemodynamically stable and acceptable  Respiratory status: nonlabored ventilation, acceptable and nasal cannula  Hydration status: acceptable

## 2018-08-17 VITALS
WEIGHT: 161 LBS | BODY MASS INDEX: 26.82 KG/M2 | TEMPERATURE: 97.9 F | SYSTOLIC BLOOD PRESSURE: 117 MMHG | HEIGHT: 65 IN | RESPIRATION RATE: 16 BRPM | DIASTOLIC BLOOD PRESSURE: 69 MMHG | HEART RATE: 88 BPM | OXYGEN SATURATION: 99 %

## 2018-08-17 PROBLEM — D72.829 LEUKOCYTOSIS: Status: ACTIVE | Noted: 2018-08-17

## 2018-08-17 PROBLEM — G89.18 ACUTE POSTOPERATIVE PAIN: Status: ACTIVE | Noted: 2018-08-17

## 2018-08-17 PROBLEM — D62 ACUTE BLOOD LOSS ANEMIA: Status: ACTIVE | Noted: 2018-08-17

## 2018-08-17 LAB
ANION GAP SERPL CALCULATED.3IONS-SCNC: 4 MMOL/L (ref 3–11)
BUN BLD-MCNC: 9 MG/DL (ref 9–23)
BUN/CREAT SERPL: 13 (ref 7–25)
CALCIUM SPEC-SCNC: 8.5 MG/DL (ref 8.7–10.4)
CHLORIDE SERPL-SCNC: 112 MMOL/L (ref 99–109)
CO2 SERPL-SCNC: 25 MMOL/L (ref 20–31)
CREAT BLD-MCNC: 0.69 MG/DL (ref 0.6–1.3)
DEPRECATED RDW RBC AUTO: 49.3 FL (ref 37–54)
ERYTHROCYTE [DISTWIDTH] IN BLOOD BY AUTOMATED COUNT: 13.9 % (ref 11.3–14.5)
GFR SERPL CREATININE-BSD FRML MDRD: 87 ML/MIN/1.73
GLUCOSE BLD-MCNC: 148 MG/DL (ref 70–100)
GLUCOSE BLDC GLUCOMTR-MCNC: 129 MG/DL (ref 70–130)
GLUCOSE BLDC GLUCOMTR-MCNC: 144 MG/DL (ref 70–130)
HCT VFR BLD AUTO: 33.8 % (ref 34.5–44)
HGB BLD-MCNC: 10.4 G/DL (ref 11.5–15.5)
MCH RBC QN AUTO: 30.2 PG (ref 27–31)
MCHC RBC AUTO-ENTMCNC: 30.8 G/DL (ref 32–36)
MCV RBC AUTO: 98.3 FL (ref 80–99)
PLATELET # BLD AUTO: 223 10*3/MM3 (ref 150–450)
PMV BLD AUTO: 11.2 FL (ref 6–12)
POTASSIUM BLD-SCNC: 4.4 MMOL/L (ref 3.5–5.5)
RBC # BLD AUTO: 3.44 10*6/MM3 (ref 3.89–5.14)
SODIUM BLD-SCNC: 141 MMOL/L (ref 132–146)
WBC NRBC COR # BLD: 16 10*3/MM3 (ref 3.5–10.8)

## 2018-08-17 PROCEDURE — 82962 GLUCOSE BLOOD TEST: CPT

## 2018-08-17 PROCEDURE — 25010000002 DEXAMETHASONE PER 1 MG: Performed by: ORTHOPAEDIC SURGERY

## 2018-08-17 PROCEDURE — 85027 COMPLETE CBC AUTOMATED: CPT | Performed by: NURSE PRACTITIONER

## 2018-08-17 PROCEDURE — 63710000001 DEXAMETHASONE PER 0.25 MG: Performed by: ORTHOPAEDIC SURGERY

## 2018-08-17 PROCEDURE — 25010000002 HYDROMORPHONE PER 4 MG: Performed by: ORTHOPAEDIC SURGERY

## 2018-08-17 PROCEDURE — 80048 BASIC METABOLIC PNL TOTAL CA: CPT | Performed by: NURSE PRACTITIONER

## 2018-08-17 RX ORDER — HYDROCODONE BITARTRATE AND ACETAMINOPHEN 10; 325 MG/1; MG/1
1 TABLET ORAL EVERY 6 HOURS PRN
Qty: 45 TABLET | Refills: 0
Start: 2018-08-17 | End: 2019-10-30

## 2018-08-17 RX ORDER — OXYCODONE AND ACETAMINOPHEN 10; 325 MG/1; MG/1
1 TABLET ORAL EVERY 4 HOURS PRN
Status: DISCONTINUED | OUTPATIENT
Start: 2018-08-17 | End: 2018-08-17 | Stop reason: HOSPADM

## 2018-08-17 RX ORDER — DOCUSATE SODIUM 100 MG/1
100 CAPSULE, LIQUID FILLED ORAL 2 TIMES DAILY
Qty: 60 CAPSULE | Refills: 0 | Status: SHIPPED | OUTPATIENT
Start: 2018-08-17 | End: 2019-10-30

## 2018-08-17 RX ADMIN — ACETAMINOPHEN 325 MG: 325 TABLET, FILM COATED ORAL at 02:34

## 2018-08-17 RX ADMIN — SERTRALINE HYDROCHLORIDE 100 MG: 100 TABLET ORAL at 08:24

## 2018-08-17 RX ADMIN — OXYCODONE HYDROCHLORIDE AND ACETAMINOPHEN 1 TABLET: 10; 325 TABLET ORAL at 10:05

## 2018-08-17 RX ADMIN — HYDROMORPHONE HYDROCHLORIDE 2 MG: 2 INJECTION, SOLUTION INTRAMUSCULAR; INTRAVENOUS; SUBCUTANEOUS at 00:13

## 2018-08-17 RX ADMIN — OXYCODONE HYDROCHLORIDE AND ACETAMINOPHEN 1 TABLET: 7.5; 325 TABLET ORAL at 02:34

## 2018-08-17 RX ADMIN — HYDROMORPHONE HYDROCHLORIDE 2 MG: 2 INJECTION, SOLUTION INTRAMUSCULAR; INTRAVENOUS; SUBCUTANEOUS at 04:18

## 2018-08-17 RX ADMIN — ZOLPIDEM TARTRATE 5 MG: 5 TABLET ORAL at 00:13

## 2018-08-17 RX ADMIN — SUCRALFATE 1 G: 1 TABLET ORAL at 08:24

## 2018-08-17 RX ADMIN — DEXAMETHASONE 4 MG: 4 TABLET ORAL at 12:26

## 2018-08-17 RX ADMIN — FAMOTIDINE 20 MG: 20 TABLET ORAL at 08:24

## 2018-08-17 RX ADMIN — NICOTINE 1 PATCH: 21 PATCH, EXTENDED RELEASE TRANSDERMAL at 08:28

## 2018-08-17 RX ADMIN — OXYCODONE HYDROCHLORIDE AND ACETAMINOPHEN 1 TABLET: 10; 325 TABLET ORAL at 06:24

## 2018-08-17 RX ADMIN — LEVOTHYROXINE SODIUM 100 MCG: 100 TABLET ORAL at 05:45

## 2018-08-17 RX ADMIN — OMEPRAZOLE 20 MG: 20 CAPSULE, DELAYED RELEASE ORAL at 08:24

## 2018-08-17 RX ADMIN — DEXAMETHASONE SODIUM PHOSPHATE 4 MG: 4 INJECTION, SOLUTION INTRAMUSCULAR; INTRAVENOUS at 05:45

## 2019-10-30 ENCOUNTER — APPOINTMENT (OUTPATIENT)
Dept: PREADMISSION TESTING | Facility: HOSPITAL | Age: 59
End: 2019-10-30

## 2019-10-30 ENCOUNTER — ANESTHESIA EVENT (OUTPATIENT)
Dept: PERIOP | Facility: HOSPITAL | Age: 59
End: 2019-10-30

## 2019-10-30 VITALS — HEIGHT: 66 IN | WEIGHT: 173.5 LBS | BODY MASS INDEX: 27.88 KG/M2

## 2019-10-30 LAB
DEPRECATED RDW RBC AUTO: 45.7 FL (ref 37–54)
ERYTHROCYTE [DISTWIDTH] IN BLOOD BY AUTOMATED COUNT: 12.7 % (ref 12.3–15.4)
HCT VFR BLD AUTO: 39.2 % (ref 34–46.6)
HGB BLD-MCNC: 12.3 G/DL (ref 12–15.9)
MCH RBC QN AUTO: 30.6 PG (ref 26.6–33)
MCHC RBC AUTO-ENTMCNC: 31.4 G/DL (ref 31.5–35.7)
MCV RBC AUTO: 97.5 FL (ref 79–97)
PLATELET # BLD AUTO: 245 10*3/MM3 (ref 140–450)
PMV BLD AUTO: 10.2 FL (ref 6–12)
POTASSIUM BLD-SCNC: 4.7 MMOL/L (ref 3.5–5.2)
RBC # BLD AUTO: 4.02 10*6/MM3 (ref 3.77–5.28)
WBC NRBC COR # BLD: 9.46 10*3/MM3 (ref 3.4–10.8)

## 2019-10-30 PROCEDURE — 85027 COMPLETE CBC AUTOMATED: CPT | Performed by: ANESTHESIOLOGY

## 2019-10-30 PROCEDURE — 36415 COLL VENOUS BLD VENIPUNCTURE: CPT

## 2019-10-30 PROCEDURE — 84132 ASSAY OF SERUM POTASSIUM: CPT | Performed by: ANESTHESIOLOGY

## 2019-10-30 RX ORDER — HYDROCODONE BITARTRATE AND ACETAMINOPHEN 10; 325 MG/1; MG/1
1 TABLET ORAL EVERY 6 HOURS PRN
COMMUNITY
End: 2019-11-01 | Stop reason: HOSPADM

## 2019-10-30 RX ORDER — LISINOPRIL 20 MG/1
20 TABLET ORAL EVERY MORNING
COMMUNITY

## 2019-10-30 RX ORDER — DULOXETIN HYDROCHLORIDE 30 MG/1
30 CAPSULE, DELAYED RELEASE ORAL DAILY
COMMUNITY

## 2019-10-31 ENCOUNTER — ANESTHESIA (OUTPATIENT)
Dept: PERIOP | Facility: HOSPITAL | Age: 59
End: 2019-10-31

## 2019-10-31 ENCOUNTER — HOSPITAL ENCOUNTER (OUTPATIENT)
Facility: HOSPITAL | Age: 59
Discharge: HOME | End: 2019-11-01
Attending: ORTHOPAEDIC SURGERY | Admitting: ORTHOPAEDIC SURGERY

## 2019-10-31 ENCOUNTER — APPOINTMENT (OUTPATIENT)
Dept: GENERAL RADIOLOGY | Facility: HOSPITAL | Age: 59
End: 2019-10-31

## 2019-10-31 PROBLEM — I10 HTN (HYPERTENSION): Status: ACTIVE | Noted: 2019-10-31

## 2019-10-31 LAB
GLUCOSE BLDC GLUCOMTR-MCNC: 107 MG/DL (ref 70–130)
GLUCOSE BLDC GLUCOMTR-MCNC: 137 MG/DL (ref 70–130)
GLUCOSE BLDC GLUCOMTR-MCNC: 142 MG/DL (ref 70–130)
GLUCOSE BLDC GLUCOMTR-MCNC: 154 MG/DL (ref 70–130)

## 2019-10-31 PROCEDURE — 25010000002 DEXAMETHASONE PER 1 MG: Performed by: NURSE ANESTHETIST, CERTIFIED REGISTERED

## 2019-10-31 PROCEDURE — 25010000002 PROPOFOL 10 MG/ML EMULSION: Performed by: NURSE ANESTHETIST, CERTIFIED REGISTERED

## 2019-10-31 PROCEDURE — L0120 CERV FLEX N/ADJ FOAM PRE OTS: HCPCS | Performed by: ORTHOPAEDIC SURGERY

## 2019-10-31 PROCEDURE — G0378 HOSPITAL OBSERVATION PER HR: HCPCS

## 2019-10-31 PROCEDURE — C1713 ANCHOR/SCREW BN/BN,TIS/BN: HCPCS | Performed by: ORTHOPAEDIC SURGERY

## 2019-10-31 PROCEDURE — 82962 GLUCOSE BLOOD TEST: CPT

## 2019-10-31 PROCEDURE — 25010000002 ONDANSETRON PER 1 MG: Performed by: ORTHOPAEDIC SURGERY

## 2019-10-31 PROCEDURE — 25010000002 ONDANSETRON PER 1 MG: Performed by: NURSE ANESTHETIST, CERTIFIED REGISTERED

## 2019-10-31 PROCEDURE — 25010000002 NEOSTIGMINE 10 MG/10ML SOLUTION: Performed by: NURSE ANESTHETIST, CERTIFIED REGISTERED

## 2019-10-31 PROCEDURE — 25010000003 CEFAZOLIN IN DEXTROSE 2-4 GM/100ML-% SOLUTION: Performed by: ORTHOPAEDIC SURGERY

## 2019-10-31 PROCEDURE — 63710000001 INSULIN LISPRO (HUMAN) PER 5 UNITS: Performed by: NURSE PRACTITIONER

## 2019-10-31 PROCEDURE — 25010000002 DEXAMETHASONE PER 1 MG: Performed by: ORTHOPAEDIC SURGERY

## 2019-10-31 PROCEDURE — 76000 FLUOROSCOPY <1 HR PHYS/QHP: CPT

## 2019-10-31 PROCEDURE — 25010000002 PROCHLORPERAZINE 10 MG/2ML SOLUTION: Performed by: INTERNAL MEDICINE

## 2019-10-31 PROCEDURE — 25010000002 HYDROMORPHONE PER 4 MG: Performed by: INTERNAL MEDICINE

## 2019-10-31 PROCEDURE — 25010000002 HYDROMORPHONE 1 MG/ML SOLUTION: Performed by: ORTHOPAEDIC SURGERY

## 2019-10-31 PROCEDURE — 25010000002 HYDROMORPHONE PER 4 MG: Performed by: NURSE ANESTHETIST, CERTIFIED REGISTERED

## 2019-10-31 PROCEDURE — 25010000002 FENTANYL CITRATE (PF) 100 MCG/2ML SOLUTION: Performed by: NURSE ANESTHETIST, CERTIFIED REGISTERED

## 2019-10-31 PROCEDURE — 25810000003 SODIUM CHLORIDE 0.9 % WITH KCL 20 MEQ 20-0.9 MEQ/L-% SOLUTION: Performed by: ORTHOPAEDIC SURGERY

## 2019-10-31 PROCEDURE — 63710000001 DEXAMETHASONE PER 0.25 MG: Performed by: ORTHOPAEDIC SURGERY

## 2019-10-31 DEVICE — ALLOGRFT BONE VIVIGEN CELLUAR MATRX FORMABLE 1CC: Type: IMPLANTABLE DEVICE | Site: SPINE CERVICAL | Status: FUNCTIONAL

## 2019-10-31 DEVICE — PLT SKYLINE 1LEVEL 12MM: Type: IMPLANTABLE DEVICE | Site: SPINE CERVICAL | Status: FUNCTIONAL

## 2019-10-31 DEVICE — BENGAL SYSTEM STANDARD IMPLANT 5MM, 7 DEGREES
Type: IMPLANTABLE DEVICE | Site: SPINE CERVICAL | Status: FUNCTIONAL
Brand: BENGAL

## 2019-10-31 DEVICE — SCRW SKYLINE VAR SD 13MM: Type: IMPLANTABLE DEVICE | Site: SPINE CERVICAL | Status: FUNCTIONAL

## 2019-10-31 RX ORDER — ESMOLOL HYDROCHLORIDE 10 MG/ML
INJECTION INTRAVENOUS AS NEEDED
Status: DISCONTINUED | OUTPATIENT
Start: 2019-10-31 | End: 2019-10-31 | Stop reason: SURG

## 2019-10-31 RX ORDER — DEXAMETHASONE 4 MG/1
4 TABLET ORAL EVERY 6 HOURS SCHEDULED
Status: DISCONTINUED | OUTPATIENT
Start: 2019-10-31 | End: 2019-11-01 | Stop reason: HOSPADM

## 2019-10-31 RX ORDER — BISACODYL 5 MG/1
5 TABLET, DELAYED RELEASE ORAL DAILY PRN
Status: DISCONTINUED | OUTPATIENT
Start: 2019-10-31 | End: 2019-11-01 | Stop reason: HOSPADM

## 2019-10-31 RX ORDER — SODIUM CHLORIDE 0.9 % (FLUSH) 0.9 %
3 SYRINGE (ML) INJECTION EVERY 12 HOURS SCHEDULED
Status: DISCONTINUED | OUTPATIENT
Start: 2019-10-31 | End: 2019-11-01 | Stop reason: HOSPADM

## 2019-10-31 RX ORDER — NEOSTIGMINE METHYLSULFATE 1 MG/ML
INJECTION, SOLUTION INTRAVENOUS AS NEEDED
Status: DISCONTINUED | OUTPATIENT
Start: 2019-10-31 | End: 2019-10-31 | Stop reason: SURG

## 2019-10-31 RX ORDER — CEFAZOLIN SODIUM 2 G/100ML
2 INJECTION, SOLUTION INTRAVENOUS EVERY 8 HOURS
Status: COMPLETED | OUTPATIENT
Start: 2019-10-31 | End: 2019-10-31

## 2019-10-31 RX ORDER — SODIUM CHLORIDE 0.9 % (FLUSH) 0.9 %
3-10 SYRINGE (ML) INJECTION AS NEEDED
Status: DISCONTINUED | OUTPATIENT
Start: 2019-10-31 | End: 2019-10-31 | Stop reason: HOSPADM

## 2019-10-31 RX ORDER — DEXAMETHASONE SODIUM PHOSPHATE 10 MG/ML
INJECTION INTRAMUSCULAR; INTRAVENOUS AS NEEDED
Status: DISCONTINUED | OUTPATIENT
Start: 2019-10-31 | End: 2019-10-31 | Stop reason: SURG

## 2019-10-31 RX ORDER — LEVOTHYROXINE SODIUM 0.1 MG/1
100 TABLET ORAL
Status: DISCONTINUED | OUTPATIENT
Start: 2019-11-01 | End: 2019-11-01 | Stop reason: HOSPADM

## 2019-10-31 RX ORDER — DEXTROSE MONOHYDRATE 25 G/50ML
25 INJECTION, SOLUTION INTRAVENOUS
Status: DISCONTINUED | OUTPATIENT
Start: 2019-10-31 | End: 2019-11-01 | Stop reason: HOSPADM

## 2019-10-31 RX ORDER — LIDOCAINE HYDROCHLORIDE 10 MG/ML
0.5 INJECTION, SOLUTION EPIDURAL; INFILTRATION; INTRACAUDAL; PERINEURAL ONCE AS NEEDED
Status: COMPLETED | OUTPATIENT
Start: 2019-10-31 | End: 2019-10-31

## 2019-10-31 RX ORDER — ONDANSETRON 2 MG/ML
4 INJECTION INTRAMUSCULAR; INTRAVENOUS EVERY 6 HOURS PRN
Status: DISCONTINUED | OUTPATIENT
Start: 2019-10-31 | End: 2019-11-01 | Stop reason: HOSPADM

## 2019-10-31 RX ORDER — PROCHLORPERAZINE 25 MG
25 SUPPOSITORY, RECTAL RECTAL EVERY 12 HOURS PRN
Status: DISCONTINUED | OUTPATIENT
Start: 2019-10-31 | End: 2019-11-01 | Stop reason: HOSPADM

## 2019-10-31 RX ORDER — ONDANSETRON 2 MG/ML
INJECTION INTRAMUSCULAR; INTRAVENOUS AS NEEDED
Status: DISCONTINUED | OUTPATIENT
Start: 2019-10-31 | End: 2019-10-31 | Stop reason: SURG

## 2019-10-31 RX ORDER — LABETALOL HYDROCHLORIDE 5 MG/ML
5 INJECTION, SOLUTION INTRAVENOUS
Status: DISCONTINUED | OUTPATIENT
Start: 2019-10-31 | End: 2019-10-31 | Stop reason: HOSPADM

## 2019-10-31 RX ORDER — BISACODYL 10 MG
10 SUPPOSITORY, RECTAL RECTAL DAILY PRN
Status: DISCONTINUED | OUTPATIENT
Start: 2019-10-31 | End: 2019-11-01 | Stop reason: HOSPADM

## 2019-10-31 RX ORDER — FAMOTIDINE 10 MG/ML
20 INJECTION, SOLUTION INTRAVENOUS EVERY 12 HOURS SCHEDULED
Status: DISCONTINUED | OUTPATIENT
Start: 2019-10-31 | End: 2019-11-01 | Stop reason: HOSPADM

## 2019-10-31 RX ORDER — NICOTINE POLACRILEX 4 MG
15 LOZENGE BUCCAL
Status: DISCONTINUED | OUTPATIENT
Start: 2019-10-31 | End: 2019-11-01 | Stop reason: HOSPADM

## 2019-10-31 RX ORDER — GLYCOPYRROLATE 0.2 MG/ML
INJECTION INTRAMUSCULAR; INTRAVENOUS AS NEEDED
Status: DISCONTINUED | OUTPATIENT
Start: 2019-10-31 | End: 2019-10-31 | Stop reason: SURG

## 2019-10-31 RX ORDER — PROPOFOL 10 MG/ML
VIAL (ML) INTRAVENOUS AS NEEDED
Status: DISCONTINUED | OUTPATIENT
Start: 2019-10-31 | End: 2019-10-31 | Stop reason: SURG

## 2019-10-31 RX ORDER — OXYCODONE AND ACETAMINOPHEN 10; 325 MG/1; MG/1
1 TABLET ORAL EVERY 4 HOURS PRN
Status: DISCONTINUED | OUTPATIENT
Start: 2019-10-31 | End: 2019-11-01 | Stop reason: HOSPADM

## 2019-10-31 RX ORDER — SODIUM CHLORIDE AND POTASSIUM CHLORIDE 150; 900 MG/100ML; MG/100ML
100 INJECTION, SOLUTION INTRAVENOUS CONTINUOUS
Status: DISCONTINUED | OUTPATIENT
Start: 2019-10-31 | End: 2019-11-01 | Stop reason: HOSPADM

## 2019-10-31 RX ORDER — OXYCODONE HCL 10 MG/1
10 TABLET, FILM COATED, EXTENDED RELEASE ORAL ONCE
Status: COMPLETED | OUTPATIENT
Start: 2019-10-31 | End: 2019-10-31

## 2019-10-31 RX ORDER — LIDOCAINE HYDROCHLORIDE 10 MG/ML
INJECTION, SOLUTION EPIDURAL; INFILTRATION; INTRACAUDAL; PERINEURAL AS NEEDED
Status: DISCONTINUED | OUTPATIENT
Start: 2019-10-31 | End: 2019-10-31 | Stop reason: SURG

## 2019-10-31 RX ORDER — FAMOTIDINE 10 MG/ML
20 INJECTION, SOLUTION INTRAVENOUS ONCE
Status: DISCONTINUED | OUTPATIENT
Start: 2019-10-31 | End: 2019-10-31 | Stop reason: HOSPADM

## 2019-10-31 RX ORDER — SODIUM CHLORIDE, SODIUM LACTATE, POTASSIUM CHLORIDE, CALCIUM CHLORIDE 600; 310; 30; 20 MG/100ML; MG/100ML; MG/100ML; MG/100ML
9 INJECTION, SOLUTION INTRAVENOUS CONTINUOUS
Status: DISCONTINUED | OUTPATIENT
Start: 2019-10-31 | End: 2019-11-01 | Stop reason: HOSPADM

## 2019-10-31 RX ORDER — ACETAMINOPHEN 500 MG
1000 TABLET ORAL ONCE
Status: COMPLETED | OUTPATIENT
Start: 2019-10-31 | End: 2019-10-31

## 2019-10-31 RX ORDER — HYDROMORPHONE HYDROCHLORIDE 1 MG/ML
0.5 INJECTION, SOLUTION INTRAMUSCULAR; INTRAVENOUS; SUBCUTANEOUS
Status: DISCONTINUED | OUTPATIENT
Start: 2019-10-31 | End: 2019-11-01 | Stop reason: HOSPADM

## 2019-10-31 RX ORDER — LISINOPRIL 20 MG/1
20 TABLET ORAL EVERY MORNING
Status: DISCONTINUED | OUTPATIENT
Start: 2019-11-01 | End: 2019-11-01 | Stop reason: HOSPADM

## 2019-10-31 RX ORDER — DULOXETIN HYDROCHLORIDE 30 MG/1
30 CAPSULE, DELAYED RELEASE ORAL DAILY
Status: DISCONTINUED | OUTPATIENT
Start: 2019-10-31 | End: 2019-11-01 | Stop reason: HOSPADM

## 2019-10-31 RX ORDER — MAGNESIUM HYDROXIDE 1200 MG/15ML
LIQUID ORAL AS NEEDED
Status: DISCONTINUED | OUTPATIENT
Start: 2019-10-31 | End: 2019-10-31 | Stop reason: HOSPADM

## 2019-10-31 RX ORDER — CEFAZOLIN SODIUM 2 G/100ML
2 INJECTION, SOLUTION INTRAVENOUS ONCE
Status: COMPLETED | OUTPATIENT
Start: 2019-10-31 | End: 2019-10-31

## 2019-10-31 RX ORDER — PROCHLORPERAZINE EDISYLATE 5 MG/ML
5 INJECTION INTRAMUSCULAR; INTRAVENOUS EVERY 6 HOURS PRN
Status: DISCONTINUED | OUTPATIENT
Start: 2019-10-31 | End: 2019-11-01 | Stop reason: HOSPADM

## 2019-10-31 RX ORDER — HYDROMORPHONE HYDROCHLORIDE 1 MG/ML
0.5 INJECTION, SOLUTION INTRAMUSCULAR; INTRAVENOUS; SUBCUTANEOUS
Status: DISCONTINUED | OUTPATIENT
Start: 2019-10-31 | End: 2019-10-31 | Stop reason: HOSPADM

## 2019-10-31 RX ORDER — SODIUM CHLORIDE 0.9 % (FLUSH) 0.9 %
3 SYRINGE (ML) INJECTION EVERY 12 HOURS SCHEDULED
Status: DISCONTINUED | OUTPATIENT
Start: 2019-10-31 | End: 2019-10-31 | Stop reason: HOSPADM

## 2019-10-31 RX ORDER — FENTANYL CITRATE 50 UG/ML
50 INJECTION, SOLUTION INTRAMUSCULAR; INTRAVENOUS
Status: DISCONTINUED | OUTPATIENT
Start: 2019-10-31 | End: 2019-10-31 | Stop reason: HOSPADM

## 2019-10-31 RX ORDER — LABETALOL HYDROCHLORIDE 5 MG/ML
INJECTION, SOLUTION INTRAVENOUS AS NEEDED
Status: DISCONTINUED | OUTPATIENT
Start: 2019-10-31 | End: 2019-10-31 | Stop reason: SURG

## 2019-10-31 RX ORDER — ACETAMINOPHEN 325 MG/1
650 TABLET ORAL EVERY 4 HOURS PRN
Status: DISCONTINUED | OUTPATIENT
Start: 2019-10-31 | End: 2019-11-01 | Stop reason: HOSPADM

## 2019-10-31 RX ORDER — FENTANYL CITRATE 50 UG/ML
INJECTION, SOLUTION INTRAMUSCULAR; INTRAVENOUS AS NEEDED
Status: DISCONTINUED | OUTPATIENT
Start: 2019-10-31 | End: 2019-10-31 | Stop reason: SURG

## 2019-10-31 RX ORDER — ROCURONIUM BROMIDE 10 MG/ML
INJECTION, SOLUTION INTRAVENOUS AS NEEDED
Status: DISCONTINUED | OUTPATIENT
Start: 2019-10-31 | End: 2019-10-31 | Stop reason: SURG

## 2019-10-31 RX ORDER — OMEPRAZOLE 20 MG/1
40 CAPSULE, DELAYED RELEASE ORAL EVERY MORNING
Status: DISCONTINUED | OUTPATIENT
Start: 2019-11-01 | End: 2019-11-01 | Stop reason: HOSPADM

## 2019-10-31 RX ORDER — ZOLPIDEM TARTRATE 5 MG/1
5 TABLET ORAL NIGHTLY PRN
Status: DISCONTINUED | OUTPATIENT
Start: 2019-10-31 | End: 2019-11-01 | Stop reason: HOSPADM

## 2019-10-31 RX ORDER — SODIUM CHLORIDE 0.9 % (FLUSH) 0.9 %
10 SYRINGE (ML) INJECTION AS NEEDED
Status: DISCONTINUED | OUTPATIENT
Start: 2019-10-31 | End: 2019-11-01 | Stop reason: HOSPADM

## 2019-10-31 RX ORDER — CETIRIZINE HYDROCHLORIDE 10 MG/1
10 TABLET ORAL DAILY
Status: DISCONTINUED | OUTPATIENT
Start: 2019-10-31 | End: 2019-11-01 | Stop reason: HOSPADM

## 2019-10-31 RX ORDER — ONDANSETRON 2 MG/ML
4 INJECTION INTRAMUSCULAR; INTRAVENOUS ONCE AS NEEDED
Status: DISCONTINUED | OUTPATIENT
Start: 2019-10-31 | End: 2019-10-31 | Stop reason: HOSPADM

## 2019-10-31 RX ORDER — FAMOTIDINE 20 MG/1
20 TABLET, FILM COATED ORAL ONCE
Status: COMPLETED | OUTPATIENT
Start: 2019-10-31 | End: 2019-10-31

## 2019-10-31 RX ORDER — SERTRALINE HYDROCHLORIDE 100 MG/1
100 TABLET, FILM COATED ORAL 2 TIMES DAILY
Status: DISCONTINUED | OUTPATIENT
Start: 2019-10-31 | End: 2019-11-01 | Stop reason: HOSPADM

## 2019-10-31 RX ORDER — LABETALOL HYDROCHLORIDE 5 MG/ML
10 INJECTION, SOLUTION INTRAVENOUS EVERY 4 HOURS PRN
Status: DISCONTINUED | OUTPATIENT
Start: 2019-10-31 | End: 2019-11-01 | Stop reason: HOSPADM

## 2019-10-31 RX ORDER — DEXAMETHASONE SODIUM PHOSPHATE 4 MG/ML
4 INJECTION, SOLUTION INTRA-ARTICULAR; INTRALESIONAL; INTRAMUSCULAR; INTRAVENOUS; SOFT TISSUE EVERY 6 HOURS SCHEDULED
Status: DISCONTINUED | OUTPATIENT
Start: 2019-10-31 | End: 2019-11-01 | Stop reason: HOSPADM

## 2019-10-31 RX ORDER — NALOXONE HCL 0.4 MG/ML
0.4 VIAL (ML) INJECTION
Status: DISCONTINUED | OUTPATIENT
Start: 2019-10-31 | End: 2019-11-01 | Stop reason: HOSPADM

## 2019-10-31 RX ORDER — PROCHLORPERAZINE MALEATE 5 MG/1
5 TABLET ORAL EVERY 6 HOURS PRN
Status: DISCONTINUED | OUTPATIENT
Start: 2019-10-31 | End: 2019-11-01 | Stop reason: HOSPADM

## 2019-10-31 RX ORDER — NICOTINE 21 MG/24HR
1 PATCH, TRANSDERMAL 24 HOURS TRANSDERMAL
Status: DISCONTINUED | OUTPATIENT
Start: 2019-10-31 | End: 2019-11-01 | Stop reason: HOSPADM

## 2019-10-31 RX ORDER — FAMOTIDINE 20 MG/1
20 TABLET, FILM COATED ORAL EVERY 12 HOURS SCHEDULED
Status: DISCONTINUED | OUTPATIENT
Start: 2019-10-31 | End: 2019-11-01 | Stop reason: HOSPADM

## 2019-10-31 RX ORDER — SUCRALFATE 1 G/1
1 TABLET ORAL 3 TIMES DAILY
Status: DISCONTINUED | OUTPATIENT
Start: 2019-10-31 | End: 2019-11-01 | Stop reason: HOSPADM

## 2019-10-31 RX ORDER — PREGABALIN 75 MG/1
75 CAPSULE ORAL ONCE
Status: COMPLETED | OUTPATIENT
Start: 2019-10-31 | End: 2019-10-31

## 2019-10-31 RX ADMIN — FENTANYL CITRATE 50 MCG: 0.05 INJECTION, SOLUTION INTRAMUSCULAR; INTRAVENOUS at 12:30

## 2019-10-31 RX ADMIN — OXYCODONE HYDROCHLORIDE AND ACETAMINOPHEN 1 TABLET: 10; 325 TABLET ORAL at 23:25

## 2019-10-31 RX ADMIN — POTASSIUM CHLORIDE AND SODIUM CHLORIDE 100 ML/HR: 900; 150 INJECTION, SOLUTION INTRAVENOUS at 14:20

## 2019-10-31 RX ADMIN — MUPIROCIN 1 APPLICATION: 20 OINTMENT TOPICAL at 07:03

## 2019-10-31 RX ADMIN — OXYCODONE HYDROCHLORIDE AND ACETAMINOPHEN 1 TABLET: 10; 325 TABLET ORAL at 15:40

## 2019-10-31 RX ADMIN — FENTANYL CITRATE 50 MCG: 0.05 INJECTION, SOLUTION INTRAMUSCULAR; INTRAVENOUS at 11:40

## 2019-10-31 RX ADMIN — LIDOCAINE HYDROCHLORIDE 50 MG: 10 INJECTION, SOLUTION EPIDURAL; INFILTRATION; INTRACAUDAL; PERINEURAL at 08:11

## 2019-10-31 RX ADMIN — ESMOLOL HYDROCHLORIDE 10 MG: 10 INJECTION INTRAVENOUS at 08:11

## 2019-10-31 RX ADMIN — CEFAZOLIN SODIUM 2 G: 2 INJECTION, SOLUTION INTRAVENOUS at 15:41

## 2019-10-31 RX ADMIN — SODIUM CHLORIDE, PRESERVATIVE FREE 3 ML: 5 INJECTION INTRAVENOUS at 21:06

## 2019-10-31 RX ADMIN — PROCHLORPERAZINE EDISYLATE 5 MG: 5 INJECTION INTRAMUSCULAR; INTRAVENOUS at 17:23

## 2019-10-31 RX ADMIN — SERTRALINE HYDROCHLORIDE 100 MG: 100 TABLET ORAL at 21:06

## 2019-10-31 RX ADMIN — HYDROMORPHONE HYDROCHLORIDE 0.5 MG: 1 INJECTION, SOLUTION INTRAMUSCULAR; INTRAVENOUS; SUBCUTANEOUS at 17:30

## 2019-10-31 RX ADMIN — HYDROMORPHONE HYDROCHLORIDE 1 MG: 1 INJECTION, SOLUTION INTRAMUSCULAR; INTRAVENOUS; SUBCUTANEOUS at 14:18

## 2019-10-31 RX ADMIN — FAMOTIDINE 20 MG: 20 TABLET ORAL at 07:02

## 2019-10-31 RX ADMIN — DEXAMETHASONE SODIUM PHOSPHATE 4 MG: 4 INJECTION, SOLUTION INTRAMUSCULAR; INTRAVENOUS at 17:11

## 2019-10-31 RX ADMIN — HYDROMORPHONE HYDROCHLORIDE 0.5 MG: 1 INJECTION, SOLUTION INTRAMUSCULAR; INTRAVENOUS; SUBCUTANEOUS at 11:06

## 2019-10-31 RX ADMIN — SUCRALFATE 1 G: 1 TABLET ORAL at 15:40

## 2019-10-31 RX ADMIN — GLYCOPYRROLATE 0.2 MG: 0.2 INJECTION, SOLUTION INTRAMUSCULAR; INTRAVENOUS at 10:39

## 2019-10-31 RX ADMIN — LABETALOL HYDROCHLORIDE 5 MG: 5 INJECTION, SOLUTION INTRAVENOUS at 10:10

## 2019-10-31 RX ADMIN — LABETALOL HYDROCHLORIDE 5 MG: 5 INJECTION, SOLUTION INTRAVENOUS at 10:29

## 2019-10-31 RX ADMIN — ACETAMINOPHEN 1000 MG: 500 TABLET ORAL at 07:02

## 2019-10-31 RX ADMIN — INSULIN LISPRO 2 UNITS: 100 INJECTION, SOLUTION INTRAVENOUS; SUBCUTANEOUS at 17:26

## 2019-10-31 RX ADMIN — CEFAZOLIN SODIUM 2 G: 2 INJECTION, SOLUTION INTRAVENOUS at 08:08

## 2019-10-31 RX ADMIN — HYDROMORPHONE HYDROCHLORIDE 0.5 MG: 1 INJECTION, SOLUTION INTRAMUSCULAR; INTRAVENOUS; SUBCUTANEOUS at 21:05

## 2019-10-31 RX ADMIN — CEFAZOLIN SODIUM 2 G: 2 INJECTION, SOLUTION INTRAVENOUS at 23:24

## 2019-10-31 RX ADMIN — PREGABALIN 75 MG: 75 CAPSULE ORAL at 07:03

## 2019-10-31 RX ADMIN — FENTANYL CITRATE 25 MCG: 50 INJECTION, SOLUTION INTRAMUSCULAR; INTRAVENOUS at 09:56

## 2019-10-31 RX ADMIN — NEOSTIGMINE METHYLSULFATE 2 MG: 1 INJECTION, SOLUTION INTRAVENOUS at 10:39

## 2019-10-31 RX ADMIN — SODIUM CHLORIDE, POTASSIUM CHLORIDE, SODIUM LACTATE AND CALCIUM CHLORIDE: 600; 310; 30; 20 INJECTION, SOLUTION INTRAVENOUS at 10:14

## 2019-10-31 RX ADMIN — FAMOTIDINE 20 MG: 20 TABLET, FILM COATED ORAL at 21:06

## 2019-10-31 RX ADMIN — DEXAMETHASONE 4 MG: 4 TABLET ORAL at 23:24

## 2019-10-31 RX ADMIN — ONDANSETRON 4 MG: 2 INJECTION INTRAMUSCULAR; INTRAVENOUS at 10:22

## 2019-10-31 RX ADMIN — OXYCODONE HYDROCHLORIDE 10 MG: 10 TABLET, FILM COATED, EXTENDED RELEASE ORAL at 07:03

## 2019-10-31 RX ADMIN — DEXAMETHASONE SODIUM PHOSPHATE 8 MG: 10 INJECTION INTRAMUSCULAR; INTRAVENOUS at 08:15

## 2019-10-31 RX ADMIN — OXYCODONE HYDROCHLORIDE AND ACETAMINOPHEN 1 TABLET: 10; 325 TABLET ORAL at 19:15

## 2019-10-31 RX ADMIN — PROPOFOL 200 MG: 10 INJECTION, EMULSION INTRAVENOUS at 08:11

## 2019-10-31 RX ADMIN — SUCRALFATE 1 G: 1 TABLET ORAL at 21:06

## 2019-10-31 RX ADMIN — ROCURONIUM BROMIDE 40 MG: 10 INJECTION INTRAVENOUS at 08:11

## 2019-10-31 RX ADMIN — CETIRIZINE HYDROCHLORIDE 10 MG: 10 TABLET, FILM COATED ORAL at 17:25

## 2019-10-31 RX ADMIN — HYDROMORPHONE HYDROCHLORIDE 0.5 MG: 1 INJECTION, SOLUTION INTRAMUSCULAR; INTRAVENOUS; SUBCUTANEOUS at 11:13

## 2019-10-31 RX ADMIN — LIDOCAINE HYDROCHLORIDE 0.5 ML: 10 INJECTION, SOLUTION EPIDURAL; INFILTRATION; INTRACAUDAL; PERINEURAL at 07:03

## 2019-10-31 RX ADMIN — FENTANYL CITRATE 50 MCG: 50 INJECTION, SOLUTION INTRAMUSCULAR; INTRAVENOUS at 10:02

## 2019-10-31 RX ADMIN — ONDANSETRON 4 MG: 2 INJECTION INTRAMUSCULAR; INTRAVENOUS at 15:46

## 2019-10-31 RX ADMIN — FENTANYL CITRATE 25 MCG: 50 INJECTION, SOLUTION INTRAMUSCULAR; INTRAVENOUS at 09:40

## 2019-10-31 RX ADMIN — SODIUM CHLORIDE, POTASSIUM CHLORIDE, SODIUM LACTATE AND CALCIUM CHLORIDE 9 ML/HR: 600; 310; 30; 20 INJECTION, SOLUTION INTRAVENOUS at 07:03

## 2019-10-31 NOTE — ANESTHESIA PREPROCEDURE EVALUATION
Anesthesia Evaluation     Patient summary reviewed and Nursing notes reviewed   no history of anesthetic complications:  NPO Solid Status: > 8 hours  NPO Liquid Status: > 2 hours           Airway   Mallampati: II  TM distance: >3 FB  Neck ROM: limited  Dental    (+) edentulous    Pulmonary - normal exam    breath sounds clear to auscultation  (+) a smoker (<1/2 ppd) Current,   Cardiovascular - normal exam    ECG reviewed  Rhythm: regular  Rate: normal    (+) hypertension,       Neuro/Psych    ROS Comment: Cervical neuropathy  GI/Hepatic/Renal/Endo    (+)  GERD well controlled,  thyroid problem hypothyroidism    Musculoskeletal     (+) neck pain (s/p ACDF),   Abdominal    Substance History      OB/GYN          Other   arthritis,                      Anesthesia Plan    ASA 2     general   (Glide-scope intubation)  intravenous induction   Anesthetic plan, all risks, benefits, and alternatives have been provided, discussed and informed consent has been obtained with: patient.    Plan discussed with CRNA.

## 2019-10-31 NOTE — ANESTHESIA PROCEDURE NOTES
Airway  Urgency: elective    Date/Time: 10/31/2019 8:13 AM  Airway not difficult    General Information and Staff    Patient location during procedure: OR  CRNA: Regina Mckeon CRNA    Indications and Patient Condition  Indications for airway management: airway protection    Preoxygenated: yes  MILS not maintained throughout  Mask difficulty assessment: 2 - vent by mask + OA or adjuvant +/- NMBA    Final Airway Details  Final airway type: endotracheal airway      Successful airway: ETT  Cuffed: yes   Successful intubation technique: video laryngoscopy  Facilitating devices/methods: intubating stylet  Endotracheal tube insertion site: oral  Blade type: glidescope.  Blade size: 3  ETT size (mm): 7.0  Cormack-Lehane Classification: grade I - full view of glottis  Placement verified by: chest auscultation and capnometry   Measured from: lips  ETT/EBT  to lips (cm): 20  Number of attempts at approach: 1    Additional Comments  Negative epigastric sounds, Breath sound equal bilaterally with symmetric chest rise and fall    Previous cervical fusion/ limited neck ROM. Elective glidescope. Neck remained in neutral position throughout induction/intubation.

## 2019-10-31 NOTE — ANESTHESIA POSTPROCEDURE EVALUATION
Patient: Pretty Squires    Procedure Summary     Date:  10/31/19 Room / Location:   [facility] [address] [place] [address] [facility]    Anesthesia Start:  0808 Anesthesia Stop:  1057    Procedure:  ANTERIOR CERVICAL DISECTOMY AND FUSION @ C4-5 WITH INSTRUMENTATION (N/A Spine Cervical) Diagnosis:      Surgeon:  Jose Maria Garcia MD Provider:  Anupam Silveira MD    Anesthesia Type:  general ASA Status:  2          Anesthesia Type: general  Last vitals  BP   158/86 (10/31/19 1056)   Temp   97 °F (36.1 °C) (10/31/19 1056)   Pulse   69 (10/31/19 1056)   Resp   12 (10/31/19 1056)     SpO2   92 % (10/31/19 1056)     Post Anesthesia Care and Evaluation    Patient location during evaluation: PACU  Patient participation: complete - patient participated  Level of consciousness: responsive to physical stimuli  Pain score: 0  Pain management: adequate  Airway patency: patent  Anesthetic complications: No anesthetic complications  PONV Status: none  Cardiovascular status: hemodynamically stable and acceptable  Respiratory status: nonlabored ventilation, acceptable and nasal cannula  Hydration status: acceptable

## 2019-11-01 VITALS
BODY MASS INDEX: 27.88 KG/M2 | SYSTOLIC BLOOD PRESSURE: 130 MMHG | HEIGHT: 66 IN | DIASTOLIC BLOOD PRESSURE: 80 MMHG | OXYGEN SATURATION: 93 % | TEMPERATURE: 98 F | RESPIRATION RATE: 16 BRPM | WEIGHT: 173.5 LBS | HEART RATE: 95 BPM

## 2019-11-01 LAB
ANION GAP SERPL CALCULATED.3IONS-SCNC: 10 MMOL/L (ref 5–15)
BUN BLD-MCNC: 10 MG/DL (ref 8–23)
BUN/CREAT SERPL: 18.9 (ref 7–25)
CALCIUM SPEC-SCNC: 9.1 MG/DL (ref 8.6–10.5)
CHLORIDE SERPL-SCNC: 105 MMOL/L (ref 98–107)
CO2 SERPL-SCNC: 26 MMOL/L (ref 22–29)
CREAT BLD-MCNC: 0.53 MG/DL (ref 0.57–1)
DEPRECATED RDW RBC AUTO: 45.5 FL (ref 37–54)
ERYTHROCYTE [DISTWIDTH] IN BLOOD BY AUTOMATED COUNT: 12.8 % (ref 12.3–15.4)
GFR SERPL CREATININE-BSD FRML MDRD: 117 ML/MIN/1.73
GLUCOSE BLD-MCNC: 132 MG/DL (ref 65–99)
GLUCOSE BLDC GLUCOMTR-MCNC: 122 MG/DL (ref 70–130)
GLUCOSE BLDC GLUCOMTR-MCNC: 131 MG/DL (ref 70–130)
HCT VFR BLD AUTO: 37.5 % (ref 34–46.6)
HGB BLD-MCNC: 11.5 G/DL (ref 12–15.9)
MCH RBC QN AUTO: 29.7 PG (ref 26.6–33)
MCHC RBC AUTO-ENTMCNC: 30.7 G/DL (ref 31.5–35.7)
MCV RBC AUTO: 96.9 FL (ref 79–97)
PLATELET # BLD AUTO: 215 10*3/MM3 (ref 140–450)
PMV BLD AUTO: 10.5 FL (ref 6–12)
POTASSIUM BLD-SCNC: 4.2 MMOL/L (ref 3.5–5.2)
RBC # BLD AUTO: 3.87 10*6/MM3 (ref 3.77–5.28)
SODIUM BLD-SCNC: 141 MMOL/L (ref 136–145)
WBC NRBC COR # BLD: 13.7 10*3/MM3 (ref 3.4–10.8)

## 2019-11-01 PROCEDURE — 25010000002 HYDROMORPHONE PER 4 MG: Performed by: INTERNAL MEDICINE

## 2019-11-01 PROCEDURE — 25010000002 PROCHLORPERAZINE 10 MG/2ML SOLUTION: Performed by: INTERNAL MEDICINE

## 2019-11-01 PROCEDURE — G0378 HOSPITAL OBSERVATION PER HR: HCPCS

## 2019-11-01 PROCEDURE — 85027 COMPLETE CBC AUTOMATED: CPT | Performed by: NURSE PRACTITIONER

## 2019-11-01 PROCEDURE — 63710000001 DEXAMETHASONE PER 0.25 MG: Performed by: ORTHOPAEDIC SURGERY

## 2019-11-01 PROCEDURE — 82962 GLUCOSE BLOOD TEST: CPT

## 2019-11-01 PROCEDURE — 80048 BASIC METABOLIC PNL TOTAL CA: CPT | Performed by: NURSE PRACTITIONER

## 2019-11-01 RX ORDER — OXYCODONE AND ACETAMINOPHEN 10; 325 MG/1; MG/1
1 TABLET ORAL EVERY 6 HOURS PRN
Qty: 40 TABLET | Refills: 0
Start: 2019-11-01

## 2019-11-01 RX ORDER — DOCUSATE SODIUM 100 MG/1
100 CAPSULE, LIQUID FILLED ORAL 2 TIMES DAILY
Qty: 60 CAPSULE | Refills: 0 | Status: SHIPPED | OUTPATIENT
Start: 2019-11-01

## 2019-11-01 RX ORDER — PROCHLORPERAZINE MALEATE 5 MG/1
5 TABLET ORAL EVERY 6 HOURS PRN
Qty: 20 TABLET | Refills: 0 | Status: SHIPPED | OUTPATIENT
Start: 2019-11-01

## 2019-11-01 RX ADMIN — DULOXETINE HYDROCHLORIDE 30 MG: 30 CAPSULE, DELAYED RELEASE ORAL at 10:03

## 2019-11-01 RX ADMIN — OXYCODONE HYDROCHLORIDE AND ACETAMINOPHEN 1 TABLET: 10; 325 TABLET ORAL at 05:31

## 2019-11-01 RX ADMIN — CETIRIZINE HYDROCHLORIDE 10 MG: 10 TABLET, FILM COATED ORAL at 10:02

## 2019-11-01 RX ADMIN — FAMOTIDINE 20 MG: 20 TABLET, FILM COATED ORAL at 10:03

## 2019-11-01 RX ADMIN — LISINOPRIL 20 MG: 20 TABLET ORAL at 05:31

## 2019-11-01 RX ADMIN — DEXAMETHASONE 4 MG: 4 TABLET ORAL at 05:31

## 2019-11-01 RX ADMIN — SUCRALFATE 1 G: 1 TABLET ORAL at 10:03

## 2019-11-01 RX ADMIN — HYDROMORPHONE HYDROCHLORIDE 0.5 MG: 1 INJECTION, SOLUTION INTRAMUSCULAR; INTRAVENOUS; SUBCUTANEOUS at 06:34

## 2019-11-01 RX ADMIN — OMEPRAZOLE 40 MG: 20 CAPSULE, DELAYED RELEASE ORAL at 05:34

## 2019-11-01 RX ADMIN — OXYCODONE HYDROCHLORIDE AND ACETAMINOPHEN 1 TABLET: 10; 325 TABLET ORAL at 10:03

## 2019-11-01 RX ADMIN — PROCHLORPERAZINE EDISYLATE 5 MG: 5 INJECTION INTRAMUSCULAR; INTRAVENOUS at 00:17

## 2019-11-01 RX ADMIN — SODIUM CHLORIDE, PRESERVATIVE FREE 3 ML: 5 INJECTION INTRAVENOUS at 10:06

## 2019-11-01 RX ADMIN — SERTRALINE HYDROCHLORIDE 100 MG: 100 TABLET ORAL at 10:03

## 2019-11-01 RX ADMIN — OXYCODONE HYDROCHLORIDE AND ACETAMINOPHEN 1 TABLET: 10; 325 TABLET ORAL at 13:35

## 2019-11-01 RX ADMIN — LEVOTHYROXINE SODIUM 100 MCG: 100 TABLET ORAL at 05:31

## 2019-11-01 NOTE — DISCHARGE SUMMARY
Patient Name: Pretty Squires  MRN: [id number]  : 1960  DOS: 2019    Attending: No att. providers found    Primary Care Provider: Zahida Arias DO    Date of Admission:.10/31/2019  6:04 AM    Date of Discharge:  2019    Discharge Diagnosis:   S/P cervical spinal fusion    Neck pain    Hypothyroid    DM (diabetes mellitus) (CMS/HCC)    Tobacco abuse    Leukocytosis, likely reactive    Acute blood loss anemia, mild, asymptomatic    Acute postoperative pain    HTN (hypertension)      Hospital Course  Patient is a 59 y.o. female presented for cervical fusion by Dr. Garcia.     She underwent surgery under GA. She tolerated surgery well and was admitted for further medical management. She has had neck pain that radiates down her LUE.     She is known to us from previous admissions, most recently 18 for cervical fusion.    Patient was provided pain medications as needed for pain control.    Adjustments were made to pain medications to optimize postop pain management. Risks and benefits of opiate medications discussed with patient.    She used an IS for atelectasis prophylaxis and mechanicals for DVT prophylaxis.  Home medications were resumed as appropriate, and labs were monitored and remained fairly stable.     With the progress she has made, she is ready for DC home today.    Discussed with patient regarding plan and she shows understanding and agreement.         Procedures Performed  PREOPERATIVE DIAGNOSES:   1. Cervical stenosis C4-C5.  2. Status post C5-C6-C7 cervical fusion.      POSTOPERATIVE DIAGNOSES:   1. Cervical stenosis C4-C5.  2. Status post C5-C6-C7 cervical fusion.      PROCEDURES PERFORMED:   1. Anterior cervical diskectomy and osteophytectomy C4-C5 to decompress neural elements.   2. Anterior interbody fusion C4-C5 with DePuy interbody fusion cage and bone graft.   3. Placement of anterior cervical plate C4-C5 (DePuy [place] plate).   4. Harvesting of bone graft locally  "from osteophytes and vertebral bodies.      SURGEON: Jose Maria Garcia MD    Pertinent Test Results:    I reviewed the patient's new clinical results.   Results from last 7 days   Lab Units 19  0626 10/30/19  1619   WBC 10*3/mm3 13.70* 9.46   HEMOGLOBIN g/dL 11.5* 12.3   HEMATOCRIT % 37.5 39.2   PLATELETS 10*3/mm3 215 245     Results for WASHINGTON MILLER (MRN [id number]) as of 2019 15:32   Ref. Range 10/30/2019 16:19   Hemoglobin Latest Ref Range: 12.0 - 15.9 g/dL 12.3   Hematocrit Latest Ref Range: 34.0 - 46.6 % 39.2     Results from last 7 days   Lab Units 19  0626 10/30/19  1619   SODIUM mmol/L 141  --    POTASSIUM mmol/L 4.2 4.7   CHLORIDE mmol/L 105  --    CO2 mmol/L 26.0  --    BUN mg/dL 10  --    CREATININE mg/dL 0.53*  --    CALCIUM mg/dL 9.1  --    GLUCOSE mg/dL 132*  --      Results for WASHINGTON MILLER (MRN [id number]) as of 2019 15:32   Ref. Range 10/31/2019 21:06 2019 06:26 2019 08:13 2019 11:56   Glucose Latest Ref Range: 70 - 130 mg/dL 142 (H) 132 (H) 131 (H) 122     I reviewed the patient's new imaging including images and reports.      Discharge Assessment:    Vital Signs  Visit Vitals  /80 (BP Location: Right arm, Patient Position: Lying)   Pulse 95   Temp 98 °F (36.7 °C) (Oral)   Resp 16   Ht 166.4 cm (65.51\")   Wt 78.7 kg (173 lb 8 oz)   SpO2 93%   BMI 28.42 kg/m²     Temp (24hrs), Av.3 °F (36.8 °C), Min:98 °F (36.7 °C), Max:98.8 °F (37.1 °C)      General Appearance:    Alert, cooperative, in no acute distress   Lungs:     Clear to auscultation,respirations regular, even and                   unlabored    Heart:    Regular rhythm and normal rate, normal S1 and S2   Abdomen:     Normal bowel sounds, no masses, no organomegaly, soft        non-tender, non-distended, no guarding, no rebound                 tenderness   Extremities:   Moves all extremities well, no edema, no cyanosis, no              redness   Pulses:   Pulses palpable and equal " bilaterally   Skin:   No bleeding, bruising or rash. Neck dressing CDI. YARED-out prior to discharge. Soft c-collar in place   Neurologic:   Cranial nerves 2 - 12 grossly intact, sensation intact       Discharge Disposition: Home    Discharge Medications     Discharge Medications      New Medications      Instructions Start Date   oxyCODONE-acetaminophen  MG per tablet  Commonly known as:  PERCOCET   1 tablet, Oral, Every 6 Hours PRN      prochlorperazine 5 MG tablet  Commonly known as:  COMPAZINE   5 mg, Oral, Every 6 Hours PRN      STOOL SOFTENER 100 MG capsule  Generic drug:  docusate sodium   100 mg, Oral, 2 Times Daily         Continue These Medications      Instructions Start Date   adalimumab 40 MG/0.8ML Prefilled Syringe Kit injection  Commonly known as:  HUMIRA   40 mg, Subcutaneous, Every 14 Days, Resume when ok with .      DULoxetine 30 MG capsule  Commonly known as:  CYMBALTA   30 mg, Oral, Daily      levothyroxine 100 MCG tablet  Commonly known as:  SYNTHROID, LEVOTHROID   100 mcg, Oral, Daily      lisinopril 20 MG tablet  Commonly known as:  PRINIVIL,ZESTRIL   20 mg, Oral, Every Morning      PRILOSEC PO   40 mg, Oral, Every Morning      sertraline 100 MG tablet  Commonly known as:  ZOLOFT   100 mg, Oral, 2 Times Daily      sucralfate 1 g tablet  Commonly known as:  CARAFATE   1 g, Oral, 3 Times Daily         Stop These Medications    HYDROcodone-acetaminophen  MG per tablet  Commonly known as:  NORCO            Discharge Diet: Consistent carb diet    Activity at Discharge: ambulate    Follow-up Appointments  Dr. Garcia per his orders      TAIWO Kaiser  11/01/19  3:32 PM

## 2019-11-01 NOTE — PLAN OF CARE
Problem: Patient Care Overview  Goal: Plan of Care Review  Outcome: Ongoing (interventions implemented as appropriate)   11/01/19 0500   Coping/Psychosocial   Plan of Care Reviewed With patient   Plan of Care Review   Progress no change   OTHER   Outcome Summary Pt remains A&Ox4. Pt VSS throughout shift. Pt pain has been an issue during shift. Pt c/o's severe pain , and has been somewhat controlled w PO pain meds, and IV pain meds. Pt has had one episode of n/v during shift, and was relieved w IV compazine. Pt dressing remains c/d/i, and YARED drain remains intact, and has not had any output during shift. Pt continues to ambulate w assist of one, and continues to void spontaneously. Will continue to monitor.        Problem: Pain, Chronic (Adult)  Goal: Identify Related Risk Factors and Signs and Symptoms  Outcome: Ongoing (interventions implemented as appropriate)      Problem: Laminectomy/Foraminotomy/Discectomy (Adult)  Goal: Signs and Symptoms of Listed Potential Problems Will be Absent, Minimized or Managed (Laminectomy/Foraminotomy/Discectomy)  Outcome: Ongoing (interventions implemented as appropriate)

## 2019-11-01 NOTE — PROGRESS NOTES
Discharge Planning Assessment  [facility]     Patient Name: Pretty Squires  MRN: [id number]  Today's Date: 11/1/2019    Admit Date: 10/31/2019    Discharge Needs Assessment     Row Name 11/01/19 1002       Living Environment    Lives With  spouse;child(courtney), adult;grandchild(courtney)    Current Living Arrangements  home/apartment/condo    Primary Care Provided by  self    Provides Primary Care For  no one    Family Caregiver if Needed  child(courtney), adult    Able to Return to Prior Arrangements  yes       Transition Planning    Patient/Family Anticipates Transition to  home    Transportation Anticipated  family or friend will provide       Discharge Needs Assessment    Equipment Currently Used at Home  none        Discharge Plan     Row Name 11/01/19 1003       Plan    Plan  Home    Patient/Family in Agreement with Plan  yes    Plan Comments  Spoke w/ patient at bedside. She lives with her , two adult children, and her granddaughter in a one story in [facility]. PTA she was independent with ADL's and mobility. She denies any needs at this time. Plan is home w/ assist from family. CM following.     Final Discharge Disposition Code  01 - home or self-care        Destination      No service coordination in this encounter.      Durable Medical Equipment      No service coordination in this encounter.      Dialysis/Infusion      No service coordination in this encounter.      Home Medical Care      No service coordination in this encounter.      Therapy      No service coordination in this encounter.      Community Resources      No service coordination in this encounter.        Expected Discharge Date and Time     Expected Discharge Date Expected Discharge Time    Nov 1, 2019         Demographic Summary     Row Name 11/01/19 1001       General Information    Arrived From  home    Reason for Consult  discharge planning        Functional Status     Row Name 11/01/19 1002       Functional Status    Usual Activity  Tolerance  moderate    Current Activity Tolerance  fair        Psychosocial    No documentation.       Abuse/Neglect    No documentation.       Legal    No documentation.       Substance Abuse    No documentation.       Patient Forms    No documentation.           Karlene Bolden RN

## 2024-12-30 ENCOUNTER — LAB (OUTPATIENT)
Dept: LAB | Facility: HOSPITAL | Age: 64
End: 2024-12-30
Payer: MEDICARE

## 2024-12-30 ENCOUNTER — TRANSCRIBE ORDERS (OUTPATIENT)
Dept: LAB | Facility: HOSPITAL | Age: 64
End: 2024-12-30
Payer: MEDICARE

## 2024-12-30 DIAGNOSIS — T84.52XD INFECTION AND INFLAMMATORY REACTION DUE TO INTERNAL LEFT HIP PROSTHESIS, SUBSEQUENT ENCOUNTER: ICD-10-CM

## 2024-12-30 DIAGNOSIS — L03.116 CELLULITIS OF LEFT LOWER EXTREMITY: ICD-10-CM

## 2024-12-30 DIAGNOSIS — L08.89 OTHER SPECIFIED LOCAL INFECTIONS OF THE SKIN AND SUBCUTANEOUS TISSUE: ICD-10-CM

## 2024-12-30 DIAGNOSIS — T84.52XD INFECTION AND INFLAMMATORY REACTION DUE TO INTERNAL LEFT HIP PROSTHESIS, SUBSEQUENT ENCOUNTER: Primary | ICD-10-CM

## 2024-12-30 LAB
ALBUMIN SERPL-MCNC: 3.7 G/DL (ref 3.5–5.2)
ALBUMIN/GLOB SERPL: 1.3 G/DL
ALP SERPL-CCNC: 100 U/L (ref 39–117)
ALT SERPL W P-5'-P-CCNC: 6 U/L (ref 1–33)
ANION GAP SERPL CALCULATED.3IONS-SCNC: 11 MMOL/L (ref 5–15)
AST SERPL-CCNC: 13 U/L (ref 1–32)
BASOPHILS # BLD AUTO: 0.05 10*3/MM3 (ref 0–0.2)
BASOPHILS NFR BLD AUTO: 0.6 % (ref 0–1.5)
BILIRUB SERPL-MCNC: <0.2 MG/DL (ref 0–1.2)
BUN SERPL-MCNC: 11 MG/DL (ref 8–23)
BUN/CREAT SERPL: 12.5 (ref 7–25)
CALCIUM SPEC-SCNC: 9.5 MG/DL (ref 8.6–10.5)
CHLORIDE SERPL-SCNC: 100 MMOL/L (ref 98–107)
CK SERPL-CCNC: 49 U/L (ref 20–180)
CO2 SERPL-SCNC: 27 MMOL/L (ref 22–29)
CREAT SERPL-MCNC: 0.88 MG/DL (ref 0.57–1)
CRP SERPL-MCNC: 1.93 MG/DL (ref 0–0.5)
DEPRECATED RDW RBC AUTO: 61.8 FL (ref 37–54)
EGFRCR SERPLBLD CKD-EPI 2021: 72.6 ML/MIN/1.73
EOSINOPHIL # BLD AUTO: 0.65 10*3/MM3 (ref 0–0.4)
EOSINOPHIL NFR BLD AUTO: 7.8 % (ref 0.3–6.2)
ERYTHROCYTE [DISTWIDTH] IN BLOOD BY AUTOMATED COUNT: 18.5 % (ref 12.3–15.4)
ERYTHROCYTE [SEDIMENTATION RATE] IN BLOOD: 52 MM/HR (ref 0–30)
GLOBULIN UR ELPH-MCNC: 2.9 GM/DL
GLUCOSE SERPL-MCNC: 122 MG/DL (ref 65–99)
HCT VFR BLD AUTO: 28.7 % (ref 34–46.6)
HGB BLD-MCNC: 8.6 G/DL (ref 12–15.9)
IMM GRANULOCYTES # BLD AUTO: 0.03 10*3/MM3 (ref 0–0.05)
IMM GRANULOCYTES NFR BLD AUTO: 0.4 % (ref 0–0.5)
LYMPHOCYTES # BLD AUTO: 0.91 10*3/MM3 (ref 0.7–3.1)
LYMPHOCYTES NFR BLD AUTO: 10.9 % (ref 19.6–45.3)
MCH RBC QN AUTO: 27.5 PG (ref 26.6–33)
MCHC RBC AUTO-ENTMCNC: 30 G/DL (ref 31.5–35.7)
MCV RBC AUTO: 91.7 FL (ref 79–97)
MONOCYTES # BLD AUTO: 0.43 10*3/MM3 (ref 0.1–0.9)
MONOCYTES NFR BLD AUTO: 5.1 % (ref 5–12)
NEUTROPHILS NFR BLD AUTO: 6.29 10*3/MM3 (ref 1.7–7)
NEUTROPHILS NFR BLD AUTO: 75.2 % (ref 42.7–76)
NRBC BLD AUTO-RTO: 0 /100 WBC (ref 0–0.2)
PLATELET # BLD AUTO: 263 10*3/MM3 (ref 140–450)
PMV BLD AUTO: 9.4 FL (ref 6–12)
POTASSIUM SERPL-SCNC: 4.6 MMOL/L (ref 3.5–5.2)
PROT SERPL-MCNC: 6.6 G/DL (ref 6–8.5)
RBC # BLD AUTO: 3.13 10*6/MM3 (ref 3.77–5.28)
SODIUM SERPL-SCNC: 138 MMOL/L (ref 136–145)
WBC NRBC COR # BLD AUTO: 8.36 10*3/MM3 (ref 3.4–10.8)

## 2024-12-30 PROCEDURE — 85652 RBC SED RATE AUTOMATED: CPT

## 2024-12-30 PROCEDURE — 82550 ASSAY OF CK (CPK): CPT

## 2024-12-30 PROCEDURE — 36415 COLL VENOUS BLD VENIPUNCTURE: CPT

## 2024-12-30 PROCEDURE — 85025 COMPLETE CBC W/AUTO DIFF WBC: CPT

## 2024-12-30 PROCEDURE — 86140 C-REACTIVE PROTEIN: CPT

## 2024-12-30 PROCEDURE — 80053 COMPREHEN METABOLIC PANEL: CPT

## (undated) DEVICE — SUCTION CANISTER, 2500CC, RIGID: Brand: DEROYAL

## (undated) DEVICE — 3M™ STERI-STRIP™ REINFORCED ADHESIVE SKIN CLOSURES, R1547, 1/2 IN X 4 IN (12 MM X 100 MM), 6 STRIPS/ENVELOPE: Brand: 3M™ STERI-STRIP™

## (undated) DEVICE — ADHS LIQ MASTISOL 2/3ML

## (undated) DEVICE — SUT SILK 2/0 TIES 18IN A185H

## (undated) DEVICE — MEDI-VAC NON-CONDUCTIVE SUCTION TUBING: Brand: CARDINAL HEALTH

## (undated) DEVICE — 3.0MM PRECISION NEURO (MATCH HEAD)

## (undated) DEVICE — TONSIL SPONGES: Brand: DEROYAL

## (undated) DEVICE — GOWN,PREVENTION PLUS,XXLARGE,STERILE: Brand: MEDLINE

## (undated) DEVICE — GOWN,REINF,POLY,ECL,PP SLV,3XL,XLONG: Brand: MEDLINE

## (undated) DEVICE — KITTNER SPONGE: Brand: DEROYAL

## (undated) DEVICE — JACKSON TABLE POSITIONER KIT: Brand: MEDLINE INDUSTRIES, INC.

## (undated) DEVICE — ADAPT LUER STUB INTRAMEDIC PE/200 17G

## (undated) DEVICE — PAD ARMBRD SURG CONVOL 7.5X20X2IN

## (undated) DEVICE — GAUZE,SPONGE,4"X4",16PLY,XRAY,STRL,LF: Brand: MEDLINE

## (undated) DEVICE — SNAP KOVER: Brand: UNBRANDED

## (undated) DEVICE — GOWN,REINF,POLY,ECL,PP SLV,XL: Brand: MEDLINE

## (undated) DEVICE — CONFIDENCE SPINAL CEMENT SYSTEM INTRODUCER NEEDLE, DIAMOND TIP 13G X 4 INCHES: Brand: CONFIDENCE SPINAL CEMENT SYSTEM

## (undated) DEVICE — DRAIN JACKSON PRATT 10FR 7MM: Brand: CARDINAL HEALTH

## (undated) DEVICE — SPNG GZ WOVN 4X4IN 12PLY 10/BX STRL

## (undated) DEVICE — CANNULA,OXY,ADULT,SUPERSOFT,W/7'TUB,UC: Brand: MEDLINE

## (undated) DEVICE — DIFFUSER: Brand: CORE, MAESTRO

## (undated) DEVICE — PK SPINE ORTHO 10

## (undated) DEVICE — 2963 MEDIPORE SOFT CLOTH TAPE 3 IN X 10 YD 12 RLS/CS: Brand: 3M™ MEDIPORE™

## (undated) DEVICE — SKYLINE ANTERIOR CERVICAL PLATE SYSTEM THREADED TEMPORARY FIX, PIN: Brand: SKYLINE

## (undated) DEVICE — ELECTRD BLD EDGE/INSUL1P 2.4X5.1MM STRL

## (undated) DEVICE — DRSNG TELFA ILND ADH 4X6IN

## (undated) DEVICE — 3M™ DURAPORE™ SURGICAL TAPE 1538-3, 3 INCH X 10 YARD (7,5CM X 9,1M), 4 ROLLS/BOX: Brand: 3M™ DURAPORE™

## (undated) DEVICE — TB SXN FRAZIER 8F STRL

## (undated) DEVICE — HEAD HALTER: Brand: DEROYAL

## (undated) DEVICE — SOL LR 1000ML

## (undated) DEVICE — ANTIBACTERIAL UNDYED BRAIDED (POLYGLACTIN 910), SYNTHETIC ABSORBABLE SUTURE: Brand: COATED VICRYL

## (undated) DEVICE — INTENDED USE FOR SURGICAL MARKING ON INTACT SKIN, ALSO PROVIDES A PERMANENT METHOD OF IDENTIFYING OBJECTS IN THE OPERATING ROOM: Brand: WRITESITE® REGULAR TIP SKIN MARKER

## (undated) DEVICE — DISPOSABLE BIPOLAR FORCEPS 7 3/4" (19.7CM) SCOVILLE BAYONET, INSULATED, 1.5MM TIP AND 12 FT. (3.6M) CABLE: Brand: KIRWAN

## (undated) DEVICE — JACKSON-PRATT 100CC BULB RESERVOIR: Brand: CARDINAL HEALTH

## (undated) DEVICE — SKYLINE ANTERIOR CERVICAL PLATE SYSTEM DRILL 2.2 X 12MM: Brand: SKYLINE

## (undated) DEVICE — DRP C/ARMOR

## (undated) DEVICE — MEDI-VAC YANKAUER SUCTION HANDLE: Brand: CARDINAL HEALTH

## (undated) DEVICE — AIRWY 90MM NO9

## (undated) DEVICE — MEDI-VAC YANKAUER SUCTION HANDLE W/BULBOUS TIP: Brand: CARDINAL HEALTH

## (undated) DEVICE — IRRISEPT WOUND DEBRIDMENT AND CLEANSING SYSTEM: Brand: IRRISEPT

## (undated) DEVICE — SYR LL 10ML LF

## (undated) DEVICE — COVER,LIGHT HANDLE,FLX,1/PK: Brand: MEDLINE INDUSTRIES, INC.

## (undated) DEVICE — OIL CARTRIDGE: Brand: CORE, MAESTRO

## (undated) DEVICE — GLV SURG SENSICARE W/ALOE PF LF 9 STRL

## (undated) DEVICE — EXTENSION TAB: Brand: DEROYAL

## (undated) DEVICE — 3M™ STERI-STRIP™ REINFORCED ADHESIVE SKIN CLOSURES, R1541, 1/4 IN X 3 IN (6 MM X 75 MM), 3 STRIPS/ENVELOPE: Brand: 3M™ STERI-STRIP™

## (undated) DEVICE — KT DRN EVAC WND PVC PCH WTROC RND 10F400

## (undated) DEVICE — 3M™ STERI-DRAPE™ INSTRUMENT POUCH 1018: Brand: STERI-DRAPE™

## (undated) DEVICE — LO CONTOUR COLLAR: Brand: DEROYAL

## (undated) DEVICE — 450 ML BOTTLE OF 0.05% CHLORHEXIDINE GLUCONATE IN 99.95% STERILE WATER FOR IRRIGATION, USP AND APPLICATOR.: Brand: IRRISEPT ANTIMICROBIAL WOUND LAVAGE

## (undated) DEVICE — CVR HNDL LT SURG ACCSSRY BLU STRL

## (undated) DEVICE — 3M™ MEDIPORE™ H SOFT CLOTH SURGICAL TAPE 2862, 2 INCH X 10 YARD (5CM X 9,1M), 12 ROLLS/CASE: Brand: 3M™ MEDIPORE™

## (undated) DEVICE — MAGNETIC DRAPE: Brand: DEVON

## (undated) DEVICE — TAPE MICROFM 2IN LF

## (undated) DEVICE — SPNG GZ STRL 2S 4X4 12PLY

## (undated) DEVICE — NDL HYPO ECLPS SFTY 22G 1 1/2IN

## (undated) DEVICE — DRSNG SURESITE WNDW 2.38X2.75

## (undated) DEVICE — DRAPE,REIN 53X77,STERILE: Brand: MEDLINE